# Patient Record
Sex: FEMALE | Race: WHITE | NOT HISPANIC OR LATINO | Employment: OTHER | URBAN - METROPOLITAN AREA
[De-identification: names, ages, dates, MRNs, and addresses within clinical notes are randomized per-mention and may not be internally consistent; named-entity substitution may affect disease eponyms.]

---

## 2023-05-30 ENCOUNTER — HOSPITAL ENCOUNTER (OUTPATIENT)
Dept: RADIOLOGY | Facility: MEDICAL CENTER | Age: 68
End: 2023-05-30
Payer: MEDICARE

## 2023-06-02 ENCOUNTER — HOSPITAL ENCOUNTER (OUTPATIENT)
Dept: RADIOLOGY | Facility: MEDICAL CENTER | Age: 68
End: 2023-06-02
Payer: MEDICARE

## 2023-06-07 ENCOUNTER — APPOINTMENT (OUTPATIENT)
Dept: RADIOLOGY | Facility: MEDICAL CENTER | Age: 68
End: 2023-06-07
Attending: OBSTETRICS & GYNECOLOGY

## 2023-06-26 ENCOUNTER — HOSPITAL ENCOUNTER (OUTPATIENT)
Dept: RADIOLOGY | Facility: MEDICAL CENTER | Age: 68
End: 2023-06-26
Attending: OBSTETRICS & GYNECOLOGY
Payer: MEDICARE

## 2023-06-26 DIAGNOSIS — Z12.31 VISIT FOR SCREENING MAMMOGRAM: ICD-10-CM

## 2023-06-26 PROCEDURE — 77063 BREAST TOMOSYNTHESIS BI: CPT

## 2023-08-04 ENCOUNTER — OFFICE VISIT (OUTPATIENT)
Dept: URGENT CARE | Facility: CLINIC | Age: 68
End: 2023-08-04
Payer: MEDICARE

## 2023-08-04 VITALS
RESPIRATION RATE: 16 BRPM | HEART RATE: 112 BPM | WEIGHT: 160 LBS | SYSTOLIC BLOOD PRESSURE: 136 MMHG | HEIGHT: 70 IN | DIASTOLIC BLOOD PRESSURE: 80 MMHG | BODY MASS INDEX: 22.9 KG/M2 | TEMPERATURE: 99 F | OXYGEN SATURATION: 94 %

## 2023-08-04 DIAGNOSIS — U07.1 COVID-19: ICD-10-CM

## 2023-08-04 PROCEDURE — 99213 OFFICE O/P EST LOW 20 MIN: CPT | Performed by: PHYSICIAN ASSISTANT

## 2023-08-04 PROCEDURE — 3075F SYST BP GE 130 - 139MM HG: CPT | Performed by: PHYSICIAN ASSISTANT

## 2023-08-04 PROCEDURE — 3079F DIAST BP 80-89 MM HG: CPT | Performed by: PHYSICIAN ASSISTANT

## 2023-08-04 ASSESSMENT — ENCOUNTER SYMPTOMS
SHORTNESS OF BREATH: 0
ABDOMINAL PAIN: 0
DIARRHEA: 0
SORE THROAT: 1
COUGH: 1
NAUSEA: 0
MYALGIAS: 0
CHILLS: 0
EYE PAIN: 0
CONSTIPATION: 0
FEVER: 0
VOMITING: 0
HEADACHES: 0

## 2023-08-05 NOTE — PROGRESS NOTES
"Subjective:   Shruthi Thompson is a 67 y.o. female who presents for Cough (X 4 days, cough, tested positive for covid today.)    67-year-old female notes a 4-day history of cough and congestion, runny nose, malaise and fatigue.  Tested positive at home this morning for COVID.  She denies any difficulty breathing, she is vaccinated against COVID-19.  She presents today for antiviral therapy.  She takes no medications daily    Review of Systems   Constitutional:  Positive for malaise/fatigue. Negative for chills and fever.   HENT:  Positive for congestion and sore throat. Negative for ear pain.    Eyes:  Negative for pain.   Respiratory:  Positive for cough. Negative for shortness of breath.    Cardiovascular:  Negative for chest pain.   Gastrointestinal:  Negative for abdominal pain, constipation, diarrhea, nausea and vomiting.   Genitourinary:  Negative for dysuria.   Musculoskeletal:  Negative for myalgias.   Skin:  Negative for rash.   Neurological:  Negative for headaches.       Medications, Allergies, and current problem list reviewed today in Epic.     Objective:     /80   Pulse (!) 112   Temp 37.2 °C (99 °F) (Temporal)   Resp 16   Ht 1.778 m (5' 10\")   Wt 72.6 kg (160 lb)   SpO2 94%     Physical Exam  Vitals reviewed.   Constitutional:       Appearance: Normal appearance.   HENT:      Head: Normocephalic and atraumatic.      Right Ear: External ear normal.      Left Ear: External ear normal.      Nose: Nose normal.      Mouth/Throat:      Mouth: Mucous membranes are moist.   Eyes:      Conjunctiva/sclera: Conjunctivae normal.   Cardiovascular:      Rate and Rhythm: Normal rate and regular rhythm.   Pulmonary:      Effort: Pulmonary effort is normal.      Breath sounds: Normal breath sounds.   Skin:     General: Skin is warm and dry.      Capillary Refill: Capillary refill takes less than 2 seconds.   Neurological:      Mental Status: She is alert and oriented to person, place, and time. "         Assessment/Plan:     Diagnosis and associated orders:     1. COVID-19  Nirmatrelvir&Ritonavir 300/100 20 x 150 MG & 10 x 100MG Tablet Therapy Pack         Comments/MDM:     I have reviewed the patient's chart and discussed current prescription and over-the-counter medication usage.  To the best my knowledge there is no evidence of decreased kidney function nor any medication usage which would adversely interact with Paxlovid antiviral therapy.  I discussed the risk versus benefits with the patient and they have agreed to trial this medication with full knowledge of its emergency use authorization.           Differential diagnosis, natural history, supportive care, and indications for immediate follow-up discussed.    Advised the patient to follow-up with the primary care physician for recheck, reevaluation, and consideration of further management.    Please note that this dictation was created using voice recognition software. I have made a reasonable attempt to correct obvious errors, but I expect that there are errors of grammar and possibly content that I did not discover before finalizing the note.    This note was electronically signed by Sravan Coates PA-C

## 2023-09-05 ENCOUNTER — HOSPITAL ENCOUNTER (EMERGENCY)
Facility: MEDICAL CENTER | Age: 68
End: 2023-09-05
Attending: EMERGENCY MEDICINE
Payer: MEDICARE

## 2023-09-05 VITALS
BODY MASS INDEX: 23.53 KG/M2 | HEART RATE: 105 BPM | TEMPERATURE: 97 F | SYSTOLIC BLOOD PRESSURE: 166 MMHG | DIASTOLIC BLOOD PRESSURE: 95 MMHG | WEIGHT: 164.02 LBS | OXYGEN SATURATION: 97 % | RESPIRATION RATE: 18 BRPM

## 2023-09-05 DIAGNOSIS — H43.812 VITREOUS DETACHMENT OF LEFT EYE: ICD-10-CM

## 2023-09-05 PROCEDURE — 99283 EMERGENCY DEPT VISIT LOW MDM: CPT

## 2023-09-05 PROCEDURE — 700101 HCHG RX REV CODE 250: Performed by: EMERGENCY MEDICINE

## 2023-09-05 RX ORDER — PROPARACAINE HYDROCHLORIDE 5 MG/ML
1 SOLUTION/ DROPS OPHTHALMIC ONCE
Status: COMPLETED | OUTPATIENT
Start: 2023-09-05 | End: 2023-09-05

## 2023-09-05 RX ADMIN — FLUORESCEIN SODIUM 2 MG: 1 STRIP OPHTHALMIC at 11:45

## 2023-09-05 RX ADMIN — PROPARACAINE HYDROCHLORIDE 1 DROP: 5 SOLUTION/ DROPS OPHTHALMIC at 11:45

## 2023-09-05 NOTE — ED PROVIDER NOTES
ED Provider Note    CHIEF COMPLAINT  Chief Complaint   Patient presents with    Blurred Vision     Cloudy spot in L eye for weeks       LIMITATION TO HISTORY   Select: None    HPI  Shruthi Thompson is a 67 y.o. female who presents to the Emergency Department for a floater in her left eye vision has been present stably for the last 2 weeks.  No head trauma.  No flashers.  No current like defect in vision.  No glasses or contact lens wear.  No recent fever rhinorrhea sore throat or cough.  No headache.       OUTSIDE HISTORIAN(S):  None     EXTERNAL RECORDS REVIEWED  Outpatient clinic note reviewed from August 4 for cough and testing positive for COVID    REVIEW OF SYSTEMS  Pertinent positives include: Visual defect in left eye.  Pertinent negatives include: Blurred vision, eye pain.    PAST MEDICAL HISTORY  None    SOCIAL HISTORY  Social History     Tobacco Use    Smoking status: Never     Passive exposure: Never    Smokeless tobacco: Never   Vaping Use    Vaping Use: Never used   Substance Use Topics    Alcohol use: Yes     Comment: occ    Drug use: Never     Social History     Substance and Sexual Activity   Drug Use Never       CURRENT MEDICATIONS    Current Facility-Administered Medications:     proparacaine (Opthaine) 0.5 % ophthalmic solution 1 Drop, 1 Drop, Both Eyes, Once, Evangelist Chacko M.D.    fluorescein ophthalmic strip 2 mg, 2 Strip, Both Eyes, Once, Evangelist Chacko M.D.    Current Outpatient Medications:     Nirmatrelvir&Ritonavir 300/100 20 x 150 MG & 10 x 100MG Tablet Therapy Pack, Take 300 mg nirmatrelvir (two 150 mg tablets) with 100 mg ritonavir (one 100 mg tablet) by mouth, with all three tablets taken together twice daily for 5 days., Disp: 30 Each, Rfl: 0    ALLERGIES  No Known Allergies    PHYSICAL EXAM  VITAL SIGNS: BP (!) 166/95   Pulse (!) 105   Temp 36.1 °C (97 °F) (Temporal)   Resp 18   Wt 74.4 kg (164 lb 0.4 oz)   SpO2 97%   BMI 23.53 kg/m²   Constitutional: Well developed,  Well nourished, tachycardic, elevated blood pressure.  HENT: Normocephalic, Atraumatic, Bilateral external ears normal, Oropharynx moist, No oral exudates, Nose normal.   Eyes: Lids: Normal   Pupils 3 mm and reactive without a Farrand pupillary defect.   Visual Acuity:20/25 os and od.    EOM: Intact without nystagmus.   Visual Fields: Intact by quadrant.   Conjunctiva: No hyperemia or discharge.   Slit Lamp: Anterior chamber normal.   Fluorscein: Not performed.   Tonometry: Not performed.   Fundus: Good red reflex.   Lymphatic: No lymphadenopathy noted.   Respiratory: Rate and excursion normal.  Skin: Warm, Dry, No erythema, No rash.   Neurologic: Cranial nerves II through XII intact        ED COURSE:    INTERVENTIONS BY ME:  Medications   proparacaine (Opthaine) 0.5 % ophthalmic solution 1 Drop (has no administration in time range)   fluorescein ophthalmic strip 2 mg (has no administration in time range)     Management discussed with Dr. Gillespie ophthalmology.  She will see the patient in clinic    MEDICAL DECISION MAKING:  This patient presents with a floater in the vision of her left eye likely has a vitreous detachment.  There is no evidence of clinical retinal detachment.  There is no history of trauma.  There is no evidence of vitreous hemorrhage.    RISK:  Low    MY PLAN:    Vitreous detachment handout given  Referral to ophthalmology completed  Return for flashers or loss of vision    Followup:      Schedule an appointment as soon as possible for a visit in 3 days      Stephanie Gillespie M.D.  [address] [place] [id number]  [phone]      CONDITION: Stable.     FINAL IMPRESSION  1. Vitreous detachment of left eye    2.     Light lamp exam    Electronically signed by: Evangelist Chacko M.D., 9/5/2023 12:23 PM

## 2023-09-05 NOTE — DISCHARGE INSTRUCTIONS
Call Dr. Gillespie for follow-up.  Return to the ER for fashioning lights or vision loss like a curtain crossing your vision.  This is not expected.

## 2023-09-05 NOTE — ED NOTES
Patient discharged per order. Oral and written discharge instructions reviewed. All belongings accounted for and taken with patient. Questions answered, and patient agrees with discharge plan. Encouraged to follow up with opthalmology.

## 2024-01-05 ENCOUNTER — APPOINTMENT (OUTPATIENT)
Dept: RADIOLOGY | Facility: MEDICAL CENTER | Age: 69
End: 2024-01-05
Attending: EMERGENCY MEDICINE
Payer: MEDICARE

## 2024-01-05 ENCOUNTER — HOSPITAL ENCOUNTER (EMERGENCY)
Facility: MEDICAL CENTER | Age: 69
End: 2024-01-05
Attending: EMERGENCY MEDICINE
Payer: MEDICARE

## 2024-01-05 VITALS
OXYGEN SATURATION: 97 % | SYSTOLIC BLOOD PRESSURE: 120 MMHG | HEIGHT: 71 IN | BODY MASS INDEX: 22.38 KG/M2 | TEMPERATURE: 98 F | WEIGHT: 159.83 LBS | HEART RATE: 78 BPM | RESPIRATION RATE: 16 BRPM | DIASTOLIC BLOOD PRESSURE: 81 MMHG

## 2024-01-05 DIAGNOSIS — K57.92 DIVERTICULITIS: ICD-10-CM

## 2024-01-05 LAB
ALBUMIN SERPL BCP-MCNC: 4.4 G/DL (ref 3.2–4.9)
ALBUMIN/GLOB SERPL: 1.5 G/DL
ALP SERPL-CCNC: 54 U/L (ref 30–99)
ALT SERPL-CCNC: 17 U/L (ref 2–50)
ANION GAP SERPL CALC-SCNC: 12 MMOL/L (ref 7–16)
APPEARANCE UR: CLEAR
AST SERPL-CCNC: 18 U/L (ref 12–45)
BACTERIA #/AREA URNS HPF: NEGATIVE /HPF
BASOPHILS # BLD AUTO: 0.6 % (ref 0–1.8)
BASOPHILS # BLD: 0.03 K/UL (ref 0–0.12)
BILIRUB SERPL-MCNC: 0.4 MG/DL (ref 0.1–1.5)
BILIRUB UR QL STRIP.AUTO: NEGATIVE
BUN SERPL-MCNC: 13 MG/DL (ref 8–22)
CALCIUM ALBUM COR SERPL-MCNC: 9.2 MG/DL (ref 8.5–10.5)
CALCIUM SERPL-MCNC: 9.5 MG/DL (ref 8.4–10.2)
CHLORIDE SERPL-SCNC: 102 MMOL/L (ref 96–112)
CO2 SERPL-SCNC: 26 MMOL/L (ref 20–33)
COLOR UR: YELLOW
CREAT SERPL-MCNC: 0.58 MG/DL (ref 0.5–1.4)
EOSINOPHIL # BLD AUTO: 0.06 K/UL (ref 0–0.51)
EOSINOPHIL NFR BLD: 1.3 % (ref 0–6.9)
EPI CELLS #/AREA URNS HPF: NORMAL /HPF
ERYTHROCYTE [DISTWIDTH] IN BLOOD BY AUTOMATED COUNT: 43.8 FL (ref 35.9–50)
GFR SERPLBLD CREATININE-BSD FMLA CKD-EPI: 99 ML/MIN/1.73 M 2
GLOBULIN SER CALC-MCNC: 2.9 G/DL (ref 1.9–3.5)
GLUCOSE SERPL-MCNC: 101 MG/DL (ref 65–99)
GLUCOSE UR STRIP.AUTO-MCNC: NEGATIVE MG/DL
HCT VFR BLD AUTO: 43.6 % (ref 37–47)
HGB BLD-MCNC: 14.8 G/DL (ref 12–16)
IMM GRANULOCYTES # BLD AUTO: 0.02 K/UL (ref 0–0.11)
IMM GRANULOCYTES NFR BLD AUTO: 0.4 % (ref 0–0.9)
KETONES UR STRIP.AUTO-MCNC: ABNORMAL MG/DL
LEUKOCYTE ESTERASE UR QL STRIP.AUTO: NEGATIVE
LIPASE SERPL-CCNC: 23 U/L (ref 11–82)
LYMPHOCYTES # BLD AUTO: 1.02 K/UL (ref 1–4.8)
LYMPHOCYTES NFR BLD: 21.9 % (ref 22–41)
MCH RBC QN AUTO: 34.7 PG (ref 27–33)
MCHC RBC AUTO-ENTMCNC: 33.9 G/DL (ref 32.2–35.5)
MCV RBC AUTO: 102.3 FL (ref 81.4–97.8)
MICRO URNS: ABNORMAL
MONOCYTES # BLD AUTO: 0.5 K/UL (ref 0–0.85)
MONOCYTES NFR BLD AUTO: 10.7 % (ref 0–13.4)
MUCOUS THREADS #/AREA URNS HPF: NORMAL /HPF
NEUTROPHILS # BLD AUTO: 3.03 K/UL (ref 1.82–7.42)
NEUTROPHILS NFR BLD: 65.1 % (ref 44–72)
NITRITE UR QL STRIP.AUTO: NEGATIVE
NRBC # BLD AUTO: 0 K/UL
NRBC BLD-RTO: 0 /100 WBC (ref 0–0.2)
PH UR STRIP.AUTO: 5.5 [PH] (ref 5–8)
PLATELET # BLD AUTO: 170 K/UL (ref 164–446)
PMV BLD AUTO: 9.1 FL (ref 9–12.9)
POTASSIUM SERPL-SCNC: 4.2 MMOL/L (ref 3.6–5.5)
PROT SERPL-MCNC: 7.3 G/DL (ref 6–8.2)
PROT UR QL STRIP: NEGATIVE MG/DL
RBC # BLD AUTO: 4.26 M/UL (ref 4.2–5.4)
RBC # URNS HPF: NORMAL /HPF
RBC UR QL AUTO: ABNORMAL
SODIUM SERPL-SCNC: 140 MMOL/L (ref 135–145)
SP GR UR STRIP.AUTO: 1.02
UNIDENT CRYS URNS QL MICRO: NORMAL /HPF
WBC # BLD AUTO: 4.7 K/UL (ref 4.8–10.8)
WBC #/AREA URNS HPF: NORMAL /HPF

## 2024-01-05 PROCEDURE — 700117 HCHG RX CONTRAST REV CODE 255: Performed by: EMERGENCY MEDICINE

## 2024-01-05 PROCEDURE — 83690 ASSAY OF LIPASE: CPT

## 2024-01-05 PROCEDURE — 36415 COLL VENOUS BLD VENIPUNCTURE: CPT

## 2024-01-05 PROCEDURE — 81001 URINALYSIS AUTO W/SCOPE: CPT

## 2024-01-05 PROCEDURE — 74177 CT ABD & PELVIS W/CONTRAST: CPT

## 2024-01-05 PROCEDURE — 99284 EMERGENCY DEPT VISIT MOD MDM: CPT

## 2024-01-05 PROCEDURE — 80053 COMPREHEN METABOLIC PANEL: CPT

## 2024-01-05 PROCEDURE — 700102 HCHG RX REV CODE 250 W/ 637 OVERRIDE(OP): Performed by: EMERGENCY MEDICINE

## 2024-01-05 PROCEDURE — 85025 COMPLETE CBC W/AUTO DIFF WBC: CPT

## 2024-01-05 PROCEDURE — A9270 NON-COVERED ITEM OR SERVICE: HCPCS | Performed by: EMERGENCY MEDICINE

## 2024-01-05 RX ORDER — AMOXICILLIN AND CLAVULANATE POTASSIUM 875; 125 MG/1; MG/1
1 TABLET, FILM COATED ORAL 2 TIMES DAILY
Qty: 14 TABLET | Refills: 0 | Status: ACTIVE | OUTPATIENT
Start: 2024-01-05 | End: 2024-01-12

## 2024-01-05 RX ORDER — AMOXICILLIN AND CLAVULANATE POTASSIUM 875; 125 MG/1; MG/1
1 TABLET, FILM COATED ORAL ONCE
Status: COMPLETED | OUTPATIENT
Start: 2024-01-05 | End: 2024-01-05

## 2024-01-05 RX ADMIN — AMOXICILLIN AND CLAVULANATE POTASSIUM 1 TABLET: 875; 125 TABLET, FILM COATED ORAL at 11:08

## 2024-01-05 RX ADMIN — IOHEXOL 100 ML: 350 INJECTION, SOLUTION INTRAVENOUS at 10:16

## 2024-01-05 NOTE — ED PROVIDER NOTES
"ER Provider Note    Scribed for Dr. Gaurav Paul M.D. by Sharmaine Jane. 1/5/2024  9:09 AM    Primary Care Provider: Kaitlyn Bateman D.O.    CHIEF COMPLAINT  Abdominal pain    EXTERNAL RECORDS REVIEWED  Outpatient Notes: The patient had a negative mammogram  in June.    HPI/ROS  LIMITATION TO HISTORY   Select: : None    OUTSIDE HISTORIAN(S):      Shruthi Thompson is a 68 y.o. female who presents to the ED for diffuse abdominal pain onset 1 week ago. She reports associated diarrhea and increased gas. She adds that she has had episodes of these symptoms intermittently over the last 6 weeks. She describes her stools as soft, thin, small pieces. She notes her symptoms are improved when she eats a bland diet. She denies any vomiting, chest pain or shortness of breath. She reports a history of tubal ligation 20+ years ago. She denies any dysuria. She notes she has been taking probiotics. The patient states she ate breakfast at 6:30 AM this morning.    PAST MEDICAL HISTORY  None noted    SURGICAL HISTORY  None noted    FAMILY HISTORY  None noted    SOCIAL HISTORY   reports that she has never smoked. She has never been exposed to tobacco smoke. She has never used smokeless tobacco. She reports current alcohol use. She reports that she does not use drugs.    CURRENT MEDICATIONS  Previous Medications    NIRMATRELVIR&RITONAVIR 300/100 20 X 150 MG & 10 X 100MG TABLET THERAPY PACK    Take 300 mg nirmatrelvir (two 150 mg tablets) with 100 mg ritonavir (one 100 mg tablet) by mouth, with all three tablets taken together twice daily for 5 days.       ALLERGIES  Patient has no known allergies.    PHYSICAL EXAM  BP (!) 133/95   Pulse 94   Temp 36.1 °C (97 °F) (Temporal)   Resp 18   Ht 1.803 m (5' 11\")   Wt 72.5 kg (159 lb 13.3 oz)   SpO2 98%   BMI 22.29 kg/m²   Constitutional: Alert in no apparent distress.  HENT: No signs of trauma, Bilateral external ears normal, Nose normal.   Eyes: Pupils are equal and " reactive, Conjunctiva normal, Non-icteric.   Neck: Normal range of motion, No tenderness, Supple,   Cardiovascular: Regular rate and rhythm, no murmurs.   Thorax & Lungs: Normal breath sounds, No respiratory distress, No wheezing, No chest tenderness.   Abdomen: Bowel sounds normal, Soft, left lower and mid abdominal tenderness, mild distension, No masses, No pulsatile masses. No peritoneal signs.  Skin: Warm, Dry, No erythema, No rash.   Back: No bony tenderness, No CVA tenderness.   Extremities: Intact distal pulses, No edema, No tenderness, No cyanosis, no tenderness  Neurologic: Alert , Normal motor function, Normal sensory function, No focal deficits noted.   Psychiatric: Affect normal     DIAGNOSTIC STUDIES & PROCEDURES    Labs:   Labs Reviewed   CBC WITH DIFFERENTIAL - Abnormal; Notable for the following components:       Result Value    WBC 4.7 (*)     .3 (*)     MCH 34.7 (*)     Lymphocytes 21.90 (*)     All other components within normal limits   COMP METABOLIC PANEL - Abnormal; Notable for the following components:    Glucose 101 (*)     All other components within normal limits   URINALYSIS - Abnormal; Notable for the following components:    Ketones Trace (*)     Occult Blood Trace (*)     All other components within normal limits   LIPASE   ESTIMATED GFR   URINE MICROSCOPIC (W/UA)     All labs reviewed by me.    Radiology:   The attending Emergency Physician has independently interpreted the diagnostic imaging associated with this visit and is awaiting the final reading from the radiologist, which will be displayed below.    Preliminary interpretation is a follows: No obvious abscess.  Radiologist interpretation:     CT-ABDOMEN-PELVIS WITH   Final Result      1.  Mild sigmoid diverticulitis.      2.  Fatty liver.      3.  Small enhancing hepatic dome lesion likely representing a hemangioma.           COURSE & MEDICAL DECISION MAKING    ED Observation Status? No; Patient does not meet criteria  for ED Observation.     INITIAL ASSESSMENT AND PLAN  Care Narrative:       9:09 AM - Patient seen and evaluated at bedside for abdominal pain.  at bedside. Discussed plan of care, including labs and imaging. The patient does not want any pain medication at this time. Patient agrees to plan of care. Ordered CBC with diff, CMP, Lipase, UA and CT-Abdomen-Pelvis with to evaluate.    10:40 AM - Imaging is remarkable for diverticulitis. Imaging also shows liver hemangioma which was discussed with the patient. She was informed of the plan for discharge home with Augmentin. She was advised of all return precautions. Patient verbalizes understanding and agreement to this plan of care.     ADDITIONAL PROBLEM LIST AND DISPOSITION   Patient with abdominal pain.  Concerned about intra-abdominal abscess versus diverticulitis.  We will evaluate the patient for anemia as well as leukocytosis as well as infection in the urine.  Will reassess after this.    She has no pancreatitis.  The patient was found have diverticulitis.  There is no abscess.  Will give Augmentin.  There is no anemia.  There is no leukocytosis.  The patient is not septic.  Will discharge home with strict return precautions and follow-up.               DISPOSITION AND DISCUSSIONS  I have discussed management of the patient with the following physicians and YAKOV's: None noted    Discussion of management with other QHP or appropriate source(s): None     Escalation of care considered, and ultimately not performed: acute inpatient care management, however at this time, the patient is most appropriate for outpatient management.    Barriers to care at this time, including but not limited to:  None noted .     Decision tools and prescription drugs considered including, but not limited to: Medication modification: I do not see any indication for medication modification.    The patient will return for new or worsening symptoms and is stable at the time of  discharge.    The patient is referred to a primary physician for blood pressure management, diabetic screening, and for all other preventative health concerns.    DISPOSITION:  Patient will be discharged home in stable condition.    FOLLOW UP:  Kaitlyn Bateman D.O.  [id number] [address] [id number] [phone]    In 3 days        OUTPATIENT MEDICATIONS:  New Prescriptions    AMOXICILLIN-CLAVULANATE (AUGMENTIN) 875-125 MG TAB    Take 1 Tablet by mouth 2 times a day for 7 days.       FINAL IMPRESSION   1. Diverticulitis         Sharmaine REID (Scribe), am scribing for, and in the presence of, Gaurav Paul M.D..    Electronically signed by: Sharmaine Jane (Zev), 1/5/2024    Gaurav REID M.D. personally performed the services described in this documentation, as scribed by Sharmaine Jane in my presence, and it is both accurate and complete.    The note accurately reflects work and decisions made by me.  Gaurav Paul M.D.  1/5/2024  2:15 PM

## 2024-01-05 NOTE — ED NOTES
Patient reports she previously gave urine specimen, however was told that there was not sufficient quantity. Patient ambulatory to restroom to give urine sample.

## 2024-01-05 NOTE — ED NOTES
Patient walks in ambulatory complaining of abdominal pain lower quadrant for the past week. Patient said this has been happening off and on for 6 weeks, but this week it is constant and worst than ever.

## 2024-01-05 NOTE — ED NOTES
Patient verbalized understanding to plan of care and discharge information. Patient in stable condition. Patient ambulated out of ED to person vehicle with stable gait with spouse.

## 2024-02-03 ENCOUNTER — OFFICE VISIT (OUTPATIENT)
Dept: URGENT CARE | Facility: CLINIC | Age: 69
End: 2024-02-03
Payer: MEDICARE

## 2024-02-03 VITALS
HEIGHT: 71 IN | BODY MASS INDEX: 22.12 KG/M2 | HEART RATE: 108 BPM | TEMPERATURE: 97.4 F | SYSTOLIC BLOOD PRESSURE: 102 MMHG | RESPIRATION RATE: 18 BRPM | OXYGEN SATURATION: 98 % | WEIGHT: 158 LBS | DIASTOLIC BLOOD PRESSURE: 66 MMHG

## 2024-02-03 DIAGNOSIS — J32.9 RHINOSINUSITIS: ICD-10-CM

## 2024-02-03 DIAGNOSIS — R05.1 ACUTE COUGH: ICD-10-CM

## 2024-02-03 LAB
FLUAV RNA SPEC QL NAA+PROBE: NEGATIVE
FLUBV RNA SPEC QL NAA+PROBE: NEGATIVE
RSV RNA SPEC QL NAA+PROBE: NEGATIVE
SARS-COV-2 RNA RESP QL NAA+PROBE: NEGATIVE

## 2024-02-03 PROCEDURE — 0241U POCT CEPHEID COV-2, FLU A/B, RSV - PCR: CPT | Performed by: PHYSICIAN ASSISTANT

## 2024-02-03 PROCEDURE — 3078F DIAST BP <80 MM HG: CPT | Performed by: PHYSICIAN ASSISTANT

## 2024-02-03 PROCEDURE — 99213 OFFICE O/P EST LOW 20 MIN: CPT | Performed by: PHYSICIAN ASSISTANT

## 2024-02-03 PROCEDURE — 3074F SYST BP LT 130 MM HG: CPT | Performed by: PHYSICIAN ASSISTANT

## 2024-02-03 RX ORDER — METHYLPREDNISOLONE 4 MG/1
TABLET ORAL
Qty: 21 TABLET | Refills: 0 | Status: SHIPPED | OUTPATIENT
Start: 2024-02-03 | End: 2024-02-14

## 2024-02-03 RX ORDER — AMOXICILLIN AND CLAVULANATE POTASSIUM 875; 125 MG/1; MG/1
1 TABLET, FILM COATED ORAL 2 TIMES DAILY
Qty: 14 TABLET | Refills: 0 | Status: SHIPPED | OUTPATIENT
Start: 2024-02-03 | End: 2024-02-10

## 2024-02-03 ASSESSMENT — FIBROSIS 4 INDEX: FIB4 SCORE: 1.72

## 2024-02-03 NOTE — PROGRESS NOTES
"Subjective:   Shruthi Thompson is a 68 y.o. female who presents for Sinus Problem (X4 days, lost voice today, lost hearing, yellow nasal discharge, sinus headache )     4 day h/o sinus congestion, pressure, progressing with ear ache bilaterally left greater than right.  Yesterday cough and increased left jaw pain preventing sleep.  Lost voice overnight.  No fevers.  Mild myalgias.  No covid concerns.  Traveled last weekend with air travel.          Medications:  Nirmatrelvir&Ritonavir 300/100 Tbpk    Allergies:             Patient has no known allergies.    Surgical History:       No past surgical history on file.    Past Social Hx:  Shruthi Thompson  reports that she has never smoked. She has never been exposed to tobacco smoke. She has never used smokeless tobacco. She reports that she does not currently use alcohol. She reports that she does not use drugs.     Past Family Hx:   Shruthi Thompson family history is not on file.       Problem list, medications, and allergies reviewed by myself today in Epic.     Objective:     /66 (BP Location: Left arm, Patient Position: Sitting, BP Cuff Size: Adult)   Pulse (!) 108   Temp 36.3 °C (97.4 °F) (Temporal)   Resp 18   Ht 1.803 m (5' 11\")   Wt 71.7 kg (158 lb)   SpO2 98%   BMI 22.04 kg/m²     Physical Exam  Vitals and nursing note reviewed.   Constitutional:       General: She is not in acute distress.     Appearance: Normal appearance. She is well-developed. She is not ill-appearing or toxic-appearing.   HENT:      Head: Normocephalic.      Right Ear: External ear normal. No tenderness. A middle ear effusion is present. No mastoid tenderness. Tympanic membrane is injected. Tympanic membrane is not perforated or bulging. Tympanic membrane has decreased mobility.      Left Ear: External ear normal. No tenderness. A middle ear effusion is present. No mastoid tenderness. Tympanic membrane is injected. Tympanic membrane is not " perforated or bulging. Tympanic membrane has decreased mobility.      Nose: Mucosal edema, congestion and rhinorrhea present.      Right Nostril: No foreign body.      Left Nostril: No foreign body.      Right Turbinates: Swollen.      Left Turbinates: Swollen.      Right Sinus: Maxillary sinus tenderness and frontal sinus tenderness present.      Left Sinus: Maxillary sinus tenderness and frontal sinus tenderness present.      Mouth/Throat:      Mouth: Mucous membranes are moist.      Pharynx: Uvula midline. Posterior oropharyngeal erythema present. No pharyngeal swelling, oropharyngeal exudate or uvula swelling.      Tonsils: No tonsillar exudate or tonsillar abscesses.      Comments: Mild pharyngeal edema.  No tonsillar exudate.  Eyes:      Extraocular Movements: Extraocular movements intact.      Pupils: Pupils are equal, round, and reactive to light.   Cardiovascular:      Rate and Rhythm: Normal rate and regular rhythm.      Pulses: Normal pulses.      Heart sounds: Normal heart sounds. No murmur heard.  Pulmonary:      Effort: Pulmonary effort is normal. No tachypnea or respiratory distress.      Breath sounds: Normal breath sounds and air entry. No stridor or decreased air movement. No decreased breath sounds, wheezing, rhonchi or rales.      Comments: Lungs are clear to auscultation bilaterally, no rhonchi rales or wheezes  Chest:      Chest wall: No tenderness.   Musculoskeletal:      Cervical back: Normal range of motion. No rigidity.   Lymphadenopathy:      Cervical: No cervical adenopathy.   Neurological:      Mental Status: She is alert.   Psychiatric:         Behavior: Behavior is cooperative.       Results for orders placed or performed in visit on 02/03/24   POCT CEPHEID COV-2, FLU A/B, RSV - PCR   Result Value Ref Range    SARS-CoV-2 by PCR Negative Negative, Invalid    Influenza virus A RNA Negative Negative, Invalid    Influenza virus B, PCR Negative Negative, Invalid    RSV, PCR Negative  Negative, Invalid      Assessment/Plan:     Diagnosis and Associated Orders:     1. Acute cough  - POCT CEPHEID COV-2, FLU A/B, RSV - PCR    2. Rhinosinusitis  - amoxicillin-clavulanate (AUGMENTIN) 875-125 MG Tab; Take 1 Tablet by mouth 2 times a day for 7 days.  Dispense: 14 Tablet; Refill: 0        Comments/MDM:  Viral PCR negative.  Viral PCR collected.  Suspect viral etiology at this time however did provide contingent antibiotic given fairly significant systemic symptoms.    Medrol Dosepak for severe sinus pressure and ear congestion.  Adverse effects of steroids discussed.    Conservative measures as below:  -Increase water intake  -May use over the counter Ibuprofen/Tylenol as needed for any fever, body aches or throat pain  -May take long acting antihistamine for seasonal allergy symptoms and post-nasal drip as needed  -Over the counter cough suppressant as directed.  -May use over the counter saline nasal spray and/or nasal lavage for nasal congestion as needed  -May use over the counter Nasacort/Flonase for nasal congestion as needed   -May use throat lozenges for throat discomfort as needed   -May gargle with salt water up to 4x/day as needed for throat discomfort (1 tsp salt dissolved in 1 cup warm water)  -Monitor for increased sinus pain/pressure with sinus congestion with thick mucus production, sinus headache, cough, shortness of breath, fever- need re-evaluation   I personally reviewed prior external notes and test results pertinent to today's visit. Supportive care, natural history, differential diagnoses, and indications for immediate follow-up discussed. Return to clinic or go to ED if symptoms worsen or persist.  Red flag symptoms discussed.  Patient/Parent/Guardian voices understanding. Follow-up with your primary care provider in 3-5 days.  All side effects of medication discussed including allergic response, GI upset, tendon injury, rash, sedation etc    Please note that this dictation was  created using voice recognition software. I have made a reasonable attempt to correct obvious errors, but I expect that there are errors of grammar and possibly content that I did not discover before finalizing the note.    This note was electronically signed by Natalie Hinson PA-C

## 2024-02-13 SDOH — ECONOMIC STABILITY: INCOME INSECURITY: IN THE LAST 12 MONTHS, WAS THERE A TIME WHEN YOU WERE NOT ABLE TO PAY THE MORTGAGE OR RENT ON TIME?: NO

## 2024-02-13 SDOH — ECONOMIC STABILITY: HOUSING INSECURITY
IN THE LAST 12 MONTHS, WAS THERE A TIME WHEN YOU DID NOT HAVE A STEADY PLACE TO SLEEP OR SLEPT IN A SHELTER (INCLUDING NOW)?: NO

## 2024-02-13 SDOH — ECONOMIC STABILITY: HOUSING INSECURITY: IN THE LAST 12 MONTHS, HOW MANY PLACES HAVE YOU LIVED?: 1

## 2024-02-13 SDOH — ECONOMIC STABILITY: TRANSPORTATION INSECURITY
IN THE PAST 12 MONTHS, HAS LACK OF TRANSPORTATION KEPT YOU FROM MEETINGS, WORK, OR FROM GETTING THINGS NEEDED FOR DAILY LIVING?: NO

## 2024-02-13 SDOH — ECONOMIC STABILITY: TRANSPORTATION INSECURITY
IN THE PAST 12 MONTHS, HAS THE LACK OF TRANSPORTATION KEPT YOU FROM MEDICAL APPOINTMENTS OR FROM GETTING MEDICATIONS?: NO

## 2024-02-13 SDOH — HEALTH STABILITY: PHYSICAL HEALTH: ON AVERAGE, HOW MANY MINUTES DO YOU ENGAGE IN EXERCISE AT THIS LEVEL?: 30 MIN

## 2024-02-13 SDOH — ECONOMIC STABILITY: FOOD INSECURITY: WITHIN THE PAST 12 MONTHS, YOU WORRIED THAT YOUR FOOD WOULD RUN OUT BEFORE YOU GOT MONEY TO BUY MORE.: NEVER TRUE

## 2024-02-13 SDOH — ECONOMIC STABILITY: FOOD INSECURITY: WITHIN THE PAST 12 MONTHS, THE FOOD YOU BOUGHT JUST DIDN'T LAST AND YOU DIDN'T HAVE MONEY TO GET MORE.: NEVER TRUE

## 2024-02-13 SDOH — ECONOMIC STABILITY: TRANSPORTATION INSECURITY
IN THE PAST 12 MONTHS, HAS LACK OF RELIABLE TRANSPORTATION KEPT YOU FROM MEDICAL APPOINTMENTS, MEETINGS, WORK OR FROM GETTING THINGS NEEDED FOR DAILY LIVING?: NO

## 2024-02-13 SDOH — HEALTH STABILITY: PHYSICAL HEALTH: ON AVERAGE, HOW MANY DAYS PER WEEK DO YOU ENGAGE IN MODERATE TO STRENUOUS EXERCISE (LIKE A BRISK WALK)?: 5 DAYS

## 2024-02-13 SDOH — HEALTH STABILITY: MENTAL HEALTH
STRESS IS WHEN SOMEONE FEELS TENSE, NERVOUS, ANXIOUS, OR CAN'T SLEEP AT NIGHT BECAUSE THEIR MIND IS TROUBLED. HOW STRESSED ARE YOU?: ONLY A LITTLE

## 2024-02-13 ASSESSMENT — LIFESTYLE VARIABLES
HOW MANY STANDARD DRINKS CONTAINING ALCOHOL DO YOU HAVE ON A TYPICAL DAY: 1 OR 2
HOW OFTEN DO YOU HAVE SIX OR MORE DRINKS ON ONE OCCASION: NEVER

## 2024-02-13 ASSESSMENT — SOCIAL DETERMINANTS OF HEALTH (SDOH)
IN A TYPICAL WEEK, HOW MANY TIMES DO YOU TALK ON THE PHONE WITH FAMILY, FRIENDS, OR NEIGHBORS?: MORE THAN THREE TIMES A WEEK
HOW MANY DRINKS CONTAINING ALCOHOL DO YOU HAVE ON A TYPICAL DAY WHEN YOU ARE DRINKING: 1 OR 2
HOW OFTEN DO YOU ATTENT MEETINGS OF THE CLUB OR ORGANIZATION YOU BELONG TO?: NEVER
HOW OFTEN DO YOU GET TOGETHER WITH FRIENDS OR RELATIVES?: TWICE A WEEK
HOW OFTEN DO YOU ATTEND CHURCH OR RELIGIOUS SERVICES?: NEVER
HOW OFTEN DO YOU HAVE SIX OR MORE DRINKS ON ONE OCCASION: NEVER
HOW OFTEN DO YOU ATTEND CHURCH OR RELIGIOUS SERVICES?: NEVER
WITHIN THE PAST 12 MONTHS, YOU WORRIED THAT YOUR FOOD WOULD RUN OUT BEFORE YOU GOT THE MONEY TO BUY MORE: NEVER TRUE
HOW OFTEN DO YOU GET TOGETHER WITH FRIENDS OR RELATIVES?: TWICE A WEEK
DO YOU BELONG TO ANY CLUBS OR ORGANIZATIONS SUCH AS CHURCH GROUPS UNIONS, FRATERNAL OR ATHLETIC GROUPS, OR SCHOOL GROUPS?: NO
HOW OFTEN DO YOU ATTENT MEETINGS OF THE CLUB OR ORGANIZATION YOU BELONG TO?: NEVER
IN A TYPICAL WEEK, HOW MANY TIMES DO YOU TALK ON THE PHONE WITH FAMILY, FRIENDS, OR NEIGHBORS?: MORE THAN THREE TIMES A WEEK
DO YOU BELONG TO ANY CLUBS OR ORGANIZATIONS SUCH AS CHURCH GROUPS UNIONS, FRATERNAL OR ATHLETIC GROUPS, OR SCHOOL GROUPS?: NO

## 2024-02-14 ENCOUNTER — OFFICE VISIT (OUTPATIENT)
Dept: MEDICAL GROUP | Facility: MEDICAL CENTER | Age: 69
End: 2024-02-14
Payer: MEDICARE

## 2024-02-14 VITALS
SYSTOLIC BLOOD PRESSURE: 116 MMHG | OXYGEN SATURATION: 97 % | DIASTOLIC BLOOD PRESSURE: 82 MMHG | BODY MASS INDEX: 22.53 KG/M2 | WEIGHT: 160.94 LBS | HEIGHT: 71 IN | HEART RATE: 99 BPM | TEMPERATURE: 97.2 F

## 2024-02-14 DIAGNOSIS — Z12.31 ENCOUNTER FOR SCREENING MAMMOGRAM FOR BREAST CANCER: ICD-10-CM

## 2024-02-14 DIAGNOSIS — Z23 NEED FOR VACCINATION: ICD-10-CM

## 2024-02-14 DIAGNOSIS — Z12.11 COLON CANCER SCREENING: ICD-10-CM

## 2024-02-14 DIAGNOSIS — Z13.29 THYROID DISORDER SCREEN: ICD-10-CM

## 2024-02-14 DIAGNOSIS — H61.22 IMPACTED CERUMEN OF LEFT EAR: ICD-10-CM

## 2024-02-14 DIAGNOSIS — Z00.00 MEDICARE ANNUAL WELLNESS VISIT, INITIAL: Primary | ICD-10-CM

## 2024-02-14 DIAGNOSIS — Z13.220 LIPID SCREENING: ICD-10-CM

## 2024-02-14 DIAGNOSIS — Z83.3 FAMILY HISTORY OF DIABETES MELLITUS: ICD-10-CM

## 2024-02-14 DIAGNOSIS — E55.9 VITAMIN D DEFICIENCY: ICD-10-CM

## 2024-02-14 DIAGNOSIS — Z00.00 MEDICARE ANNUAL WELLNESS VISIT, SUBSEQUENT: ICD-10-CM

## 2024-02-14 PROCEDURE — 90472 IMMUNIZATION ADMIN EACH ADD: CPT | Performed by: INTERNAL MEDICINE

## 2024-02-14 PROCEDURE — G0438 PPPS, INITIAL VISIT: HCPCS | Mod: 25 | Performed by: INTERNAL MEDICINE

## 2024-02-14 PROCEDURE — 90662 IIV NO PRSV INCREASED AG IM: CPT | Performed by: INTERNAL MEDICINE

## 2024-02-14 PROCEDURE — G0008 ADMIN INFLUENZA VIRUS VAC: HCPCS | Performed by: INTERNAL MEDICINE

## 2024-02-14 PROCEDURE — 3079F DIAST BP 80-89 MM HG: CPT | Performed by: INTERNAL MEDICINE

## 2024-02-14 PROCEDURE — 3074F SYST BP LT 130 MM HG: CPT | Performed by: INTERNAL MEDICINE

## 2024-02-14 PROCEDURE — 90715 TDAP VACCINE 7 YRS/> IM: CPT | Performed by: INTERNAL MEDICINE

## 2024-02-14 RX ORDER — ELECTROLYTES/DEXTROSE
SOLUTION, ORAL ORAL
COMMUNITY

## 2024-02-14 RX ORDER — PYRITHIONE ZINC 1 G/ML
LOTION/SHAMPOO TOPICAL
COMMUNITY

## 2024-02-14 ASSESSMENT — ACTIVITIES OF DAILY LIVING (ADL): BATHING_REQUIRES_ASSISTANCE: 0

## 2024-02-14 ASSESSMENT — PATIENT HEALTH QUESTIONNAIRE - PHQ9: CLINICAL INTERPRETATION OF PHQ2 SCORE: 0

## 2024-02-14 ASSESSMENT — FIBROSIS 4 INDEX: FIB4 SCORE: 1.72

## 2024-02-14 ASSESSMENT — ENCOUNTER SYMPTOMS: GENERAL WELL-BEING: EXCELLENT

## 2024-02-14 NOTE — PROGRESS NOTES
No chief complaint on file.      HPI:  Shruthi Thompson is a 68 y.o. here for Medicare Annual Wellness Visit   Problem   Medicare Annual Wellness Visit, Initial    Patient presents for Medicare wellness visit.  She is feeling well and denies active complaints.    She is due for influenza and Tdap-she would like to receive in office today.    She will be due for mammogram in June 2023.    She has never had a colonoscopy, she did have Cologuard's in the past, which were all normal.    She would like to have a Cologuard since she does not have any family history of colon cancer.    She declines bone density at this time.           Impacted Cerumen of Left Ear       Patient Active Problem List    Diagnosis Date Noted    Medicare annual wellness visit, initial 02/14/2024    Impacted cerumen of left ear 02/14/2024     Current Outpatient Medications   Medication Sig Dispense Refill    Multiple Vitamin (MULTIVITAMIN ADULT) Tab Take  by mouth.      Metamucil Fiber Chew Tab Chew.       No current facility-administered medications for this visit.          Current supplements as per medication list.     Allergies: Patient has no known allergies.    Current social contact/activities: going out, friends     She  reports that she has never smoked. She has never been exposed to tobacco smoke. She has never used smokeless tobacco. She reports current alcohol use of about 3.6 oz of alcohol per week. She reports that she does not use drugs.  Counseling given: Not Answered    ROS:    Gait: Uses no assistive device  Ostomy: No  Other tubes: No  Amputations: No  Chronic oxygen use: No  Last eye exam: Jan 2024  Wears hearing aids: No   : Denies any urinary leakage during the last 6 months    Screening:    Depression Screening  Little interest or pleasure in doing things?  0 - not at all  Feeling down, depressed , or hopeless? 0 - not at all  Patient Health Questionnaire Score: 0     If depressive symptoms identified deferred  to follow up visit unless specifically addressed in assessment and plan.    Interpretation of PHQ-9 Total Score   Score Severity   1-4 No Depression   5-9 Mild Depression   10-14 Moderate Depression   15-19 Moderately Severe Depression   20-27 Severe Depression    Screening for Cognitive Impairment  Do you or any of your friends or family members have any concern about your memory? No  Three Minute Recall (Banana, Sunrise, Chair) 3/3    [place]roy clock face with all 12 numbers and set the hands to show 20 past 8.  Yes    Cognitive concerns identified deferred for follow up unless specifically addressed in assessment and plan.    Fall Risk Assessment  Has the patient had two or more falls in the last year or any fall with injury in the last year?  No    Safety Assessment  Do you always wear your seatbelt?  Yes  Any changes to home needed to function safely? No  Difficulty hearing.  Yes  Patient counseled about all safety risks that were identified.    Functional Assessment ADLs  Are there any barriers preventing you from cooking for yourself or meeting nutritional needs?  No.    Are there any barriers preventing you from driving safely or obtaining transportation?  No.    Are there any barriers preventing you from using a telephone or calling for help?  No    Are there any barriers preventing you from shopping?  No.    Are there any barriers preventing you from taking care of your own finances?  No    Are there any barriers preventing you from managing your medications?  No    Are there any barriers preventing you from showering, bathing or dressing yourself? No    Are there any barriers preventing you from doing housework or laundry? No  Are there any barriers preventing you from using the toilet?No  Are you currently engaging in any exercise or physical activity?  Yes.      Self-Assessment of Health  What is your perception of your health? Excellent  Do you sleep more than six hours a night? Yes  In the past 7 days, how  much did pain keep you from doing your normal work? None  Do you spend quality time with family or friends (virtually or in person)? Yes  Do you usually eat a heart healthy diet that constists of a variety of fruits, vegetables, whole grains and fiber? Yes  Do you eat foods high in fat and/or Fast Food more than three times per week? No    Advance Care Planning  Do you have an Advance Directive, Living Will, Durable Power of , or POLST? No               Health Maintenance Summary            Overdue - Hepatitis C Screening (Once) Never done      No completion history exists for this topic.              Overdue - COVID-19 Vaccine (1) Never done      No completion history exists for this topic.              Ordered - Colorectal Cancer Screening (View Topic Details) Ordered on 2/14/2024      No completion history exists for this topic.              Overdue - Zoster (Shingles) Vaccines (1 of 2) Never done      No completion history exists for this topic.              Postponed - Bone Density Scan (Every 5 Years) Postponed until 2/14/2025      No completion history exists for this topic.              Postponed - Pneumococcal Vaccine: 65+ Years (1 of 1 - PCV) Postponed until 2/14/2025      No completion history exists for this topic.              Annual Wellness Visit (Yearly) Next due on 2/14/2025 02/14/2024  Visit Dx: Medicare annual wellness visit, subsequent    02/14/2024  Prob Dx: Medicare annual wellness visit, initial    02/14/2024  Visit Dx: Medicare annual wellness visit, initial    02/14/2024  Level of Service: INITIAL ANNUAL WELLNESS VISIT-INCLUDES PPPS              Ordered - Mammogram (Every 2 Years) Ordered on 2/14/2024 06/26/2023  MA-SCREENING MAMMO BILAT W/TOMOSYNTHESIS W/CAD    06/06/2022  MA-SCREENING MAMMO BILAT W/TOMOSYNTHESIS W/CAD    05/08/2021  MA-SCREENING MAMMO BILAT W/TOMOSYNTHESIS W/CAD    09/05/2019  MA-SCREENING MAMMO BILAT W/TOMOSYNTHESIS W/CAD              IMM DTaP/Tdap/Td  "Vaccine (2 - Td or Tdap) Next due on 2/14/2034 02/14/2024  Imm Admin: Tdap Vaccine              Influenza Vaccine (Series Information) Completed      02/14/2024  Imm Admin: Influenza Vaccine Adult HD              Hepatitis A Vaccine (Hep A) (Series Information) Aged Out      No completion history exists for this topic.              Hepatitis B Vaccine (Hep B) (Series Information) Aged Out      No completion history exists for this topic.              HPV Vaccines (Series Information) Aged Out      No completion history exists for this topic.              Polio Vaccine (Inactivated Polio) (Series Information) Aged Out      No completion history exists for this topic.              Meningococcal Immunization (Series Information) Aged Out      No completion history exists for this topic.                  Patient Care Team:  Lisa Alva M.D. as PCP - General (Internal Medicine)    Social History     Tobacco Use    Smoking status: Never     Passive exposure: Never    Smokeless tobacco: Never   Vaping Use    Vaping Use: Never used   Substance Use Topics    Alcohol use: Yes     Alcohol/week: 3.6 oz     Types: 6 Glasses of wine per week     Comment: Have a vodka 3 times a week    Drug use: Never     Family History   Problem Relation Age of Onset    Breast Cancer Mother     Diabetes Father     Heart Disease Father     Hypertension Father     Breast Cancer Maternal Aunt      She  has a past medical history of Diverticulitis.   Past Surgical History:   Procedure Laterality Date    OPEN REDUCTION      TUBAL COAGULATION LAPAROSCOPIC BILATERAL         Exam:   /82 (BP Location: Left arm, Patient Position: Sitting, BP Cuff Size: Adult)   Pulse 99   Temp 36.2 °C (97.2 °F) (Temporal)   Ht 1.803 m (5' 11\")   Wt 73 kg (160 lb 15 oz)   SpO2 97%  Body mass index is 22.45 kg/m².    Hearing good.    Dentition good  Alert, oriented in no acute distress.  Eye contact is good, speech goal directed, affect " calm    Assessment and Plan. The following treatment and monitoring plan is recommended:    Problem List Items Addressed This Visit       Impacted cerumen of left ear    Relevant Orders    Ear Irrigation ([place] Only)    Medicare annual wellness visit, initial - Primary     Other Visit Diagnoses       Need for vaccination        Relevant Orders    INFLUENZA VACCINE, HIGH DOSE (65+ ONLY) (Completed)    Tdap =>8yo IM (Completed)    Medicare annual wellness visit, subsequent        Relevant Orders    CBC WITH DIFFERENTIAL    Comp Metabolic Panel    Lipid screening        Relevant Orders    Lipid Profile    Thyroid disorder screen        Relevant Orders    TSH WITH REFLEX TO FT4    Vitamin D deficiency        Relevant Orders    VITAMIN D,25 HYDROXY (DEFICIENCY)    Family history of diabetes mellitus        Relevant Orders    HEMOGLOBIN A1C    Colon cancer screening        Relevant Orders    COLOGUARD (FIT DNA)    Encounter for screening mammogram for breast cancer        Relevant Orders    MA-SCREENING MAMMO BILAT W/TOMOSYNTHESIS W/CAD          Services suggested: No services needed at this time  Health Care Screening: Age-appropriate preventive services recommended by USPTF and ACIP covered by Medicare were discussed today. Services ordered if indicated and agreed upon by the patient.  Referrals offered: Community-based lifestyle interventions to reduce health risks and promote self-management and wellness, fall prevention, nutrition, physical activity, tobacco-use cessation, weight loss, and mental health services as per orders if indicated.    Discussion today about general wellness and lifestyle habits:    Prevent falls and reduce trip hazards; Cautioned about securing or removing rugs.  Have a working fire alarm and carbon monoxide detector;   Engage in regular physical activity and social activities     Follow-up: Return in about 1 year (around 2/14/2025) for Medicare Wellness visit.  Pending labs     Please note that  this dictation was created using voice recognition software. I have made every reasonable attempt to correct obvious errors, but I expect that there are errors of grammar and possibly content that I did not discover before finalizing the note.

## 2024-02-21 ENCOUNTER — TELEMEDICINE (OUTPATIENT)
Dept: MEDICAL GROUP | Facility: MEDICAL CENTER | Age: 69
End: 2024-02-21
Payer: MEDICARE

## 2024-02-21 VITALS — WEIGHT: 160 LBS | HEIGHT: 71 IN | BODY MASS INDEX: 22.4 KG/M2

## 2024-02-21 DIAGNOSIS — J01.81 OTHER ACUTE RECURRENT SINUSITIS: ICD-10-CM

## 2024-02-21 PROBLEM — J01.80 OTHER ACUTE SINUSITIS: Status: ACTIVE | Noted: 2024-02-21

## 2024-02-21 PROCEDURE — 99213 OFFICE O/P EST LOW 20 MIN: CPT | Mod: 95 | Performed by: INTERNAL MEDICINE

## 2024-02-21 RX ORDER — METHYLPREDNISOLONE 4 MG/1
TABLET ORAL
Qty: 21 TABLET | Refills: 0 | Status: SHIPPED | OUTPATIENT
Start: 2024-02-21

## 2024-02-21 ASSESSMENT — ENCOUNTER SYMPTOMS
FEVER: 0
SINUS PAIN: 1

## 2024-02-21 ASSESSMENT — FIBROSIS 4 INDEX: FIB4 SCORE: 1.72

## 2024-02-21 NOTE — ASSESSMENT & PLAN NOTE
This is an ongoing problem, likely secondary to serous sinusitis.   - Medrol Dosepak.   - Antibiotic treatment is not indicated at this time.   - Flonase   - Lora pot (distilled/bottled water only)   - Humidifier   - Oral antihistamines nondrowsy Zyrtec/Allegra/Claritin   - Return to care if symptoms worsen or fail to improve

## 2024-02-21 NOTE — PROGRESS NOTES
"Subjective:     CC:   Chief Complaint   Patient presents with    Follow-Up     Sinus issues- x4 weeks, nasal congestion, colored discharge before now its clear   This visit was conducted via Zoom using secure and encrypted videoconferencing technology.   The [facility]    The patient's identity was confirmed and verbal consent was obtained for this virtual visit.     The encounter diagnosis was Other acute recurrent sinusitis.    HPI: Shruthi is a pleasant 68 y.o. female who presents today for evaluation of the following problem:  Problem   Other Acute Sinusitis    This is an ongoing problem, symptoms started approximately 4 weeks ago.  Patient was seen at urgent care on 2/3/2024 for evaluation of rhinosinusitis (urgent care note reviewed).  She was prescribed a course of Medrol Dosepak and Augmentin, was tested for flu/RSV and COVID.   Was feeling better after she received medications, however symptoms started coming back.  Yesterday she was feeling worse: She took Robitussin syrup, Benadryl and she has been using Flonase spray twice daily.  She has a lot of pressure in her sinuses, as well as postnasal drip.  Mucus is clear.             Past Medical History:   Diagnosis Date    Diverticulitis      Current Outpatient Medications Ordered in Epic   Medication Sig Dispense Refill    methylPREDNISolone (MEDROL DOSEPAK) 4 MG Tablet Therapy Pack As directed on the packaging label. 21 Tablet 0    Multiple Vitamin (MULTIVITAMIN ADULT) Tab Take  by mouth.      Metamucil Fiber Chew Tab Chew.       No current [facility]-ordered facility-administered medications on file.     Review of Systems   Constitutional:  Negative for fever.   HENT:  Positive for congestion and sinus pain.    All other systems reviewed and are negative.    Objective:     Exam:  Ht 1.803 m (5' 11\")   Wt 72.6 kg (160 lb) Comment: pt stated  BMI 22.32 kg/m²  Body mass index is 22.32 kg/m².    Physical Exam  Constitutional: "       General: She is not in acute distress.     Appearance: Normal appearance. She is ill-appearing.      Comments: Limited exam due to virtual appointment   HENT:      Head: Normocephalic and atraumatic.   Pulmonary:      Effort: Pulmonary effort is normal.   Neurological:      Mental Status: She is alert and oriented to person, place, and time.   Psychiatric:         Mood and Affect: Mood normal.       Assessment & Plan:   Shruthi  is a pleasant 68 y.o. female with the following -     Problem List Items Addressed This Visit       Other acute sinusitis     This is an ongoing problem, likely secondary to serous sinusitis.   - Medrol Dosepak.   - Antibiotic treatment is not indicated at this time.   - Flonase   - Lora pot (distilled/bottled water only)   - Humidifier   - Oral antihistamines nondrowsy Zyrtec/Allegra/Claritin   - Return to care if symptoms worsen or fail to improve           Relevant Medications    methylPREDNISolone (MEDROL DOSEPAK) 4 MG Tablet Therapy Pack     Return if symptoms worsen or fail to improve.    Please note that this dictation was created using voice recognition software. I have made every reasonable attempt to correct obvious errors, but I expect that there are errors of grammar and possibly content that I did not discover before finalizing the note.

## 2024-04-08 ENCOUNTER — HOSPITAL ENCOUNTER (OUTPATIENT)
Dept: LAB | Facility: MEDICAL CENTER | Age: 69
End: 2024-04-08
Attending: INTERNAL MEDICINE
Payer: MEDICARE

## 2024-04-08 DIAGNOSIS — Z00.00 MEDICARE ANNUAL WELLNESS VISIT, SUBSEQUENT: ICD-10-CM

## 2024-04-08 DIAGNOSIS — E55.9 VITAMIN D DEFICIENCY: ICD-10-CM

## 2024-04-08 DIAGNOSIS — Z13.220 LIPID SCREENING: ICD-10-CM

## 2024-04-08 DIAGNOSIS — Z83.3 FAMILY HISTORY OF DIABETES MELLITUS: ICD-10-CM

## 2024-04-08 DIAGNOSIS — Z13.29 THYROID DISORDER SCREEN: ICD-10-CM

## 2024-04-08 LAB
25(OH)D3 SERPL-MCNC: 35 NG/ML (ref 30–100)
ALBUMIN SERPL BCP-MCNC: 4.5 G/DL (ref 3.2–4.9)
ALBUMIN/GLOB SERPL: 1.3 G/DL
ALP SERPL-CCNC: 77 U/L (ref 30–99)
ALT SERPL-CCNC: 28 U/L (ref 2–50)
ANION GAP SERPL CALC-SCNC: 12 MMOL/L (ref 7–16)
AST SERPL-CCNC: 36 U/L (ref 12–45)
BASOPHILS # BLD AUTO: 0.5 % (ref 0–1.8)
BASOPHILS # BLD: 0.02 K/UL (ref 0–0.12)
BILIRUB SERPL-MCNC: 1.2 MG/DL (ref 0.1–1.5)
BUN SERPL-MCNC: 13 MG/DL (ref 8–22)
CALCIUM ALBUM COR SERPL-MCNC: 9.6 MG/DL (ref 8.5–10.5)
CALCIUM SERPL-MCNC: 10 MG/DL (ref 8.4–10.2)
CHLORIDE SERPL-SCNC: 99 MMOL/L (ref 96–112)
CHOLEST SERPL-MCNC: 310 MG/DL (ref 100–199)
CO2 SERPL-SCNC: 27 MMOL/L (ref 20–33)
CREAT SERPL-MCNC: 0.58 MG/DL (ref 0.5–1.4)
EOSINOPHIL # BLD AUTO: 0.06 K/UL (ref 0–0.51)
EOSINOPHIL NFR BLD: 1.6 % (ref 0–6.9)
ERYTHROCYTE [DISTWIDTH] IN BLOOD BY AUTOMATED COUNT: 48 FL (ref 35.9–50)
EST. AVERAGE GLUCOSE BLD GHB EST-MCNC: 100 MG/DL
FASTING STATUS PATIENT QL REPORTED: NORMAL
GFR SERPLBLD CREATININE-BSD FMLA CKD-EPI: 99 ML/MIN/1.73 M 2
GLOBULIN SER CALC-MCNC: 3.5 G/DL (ref 1.9–3.5)
GLUCOSE SERPL-MCNC: 103 MG/DL (ref 65–99)
HBA1C MFR BLD: 5.1 % (ref 4–5.6)
HCT VFR BLD AUTO: 47.1 % (ref 37–47)
HDLC SERPL-MCNC: 92 MG/DL
HGB BLD-MCNC: 15.9 G/DL (ref 12–16)
IMM GRANULOCYTES # BLD AUTO: 0 K/UL (ref 0–0.11)
IMM GRANULOCYTES NFR BLD AUTO: 0 % (ref 0–0.9)
LDLC SERPL CALC-MCNC: 194 MG/DL
LYMPHOCYTES # BLD AUTO: 1.18 K/UL (ref 1–4.8)
LYMPHOCYTES NFR BLD: 30.6 % (ref 22–41)
MCH RBC QN AUTO: 33.9 PG (ref 27–33)
MCHC RBC AUTO-ENTMCNC: 33.8 G/DL (ref 32.2–35.5)
MCV RBC AUTO: 100.4 FL (ref 81.4–97.8)
MONOCYTES # BLD AUTO: 0.35 K/UL (ref 0–0.85)
MONOCYTES NFR BLD AUTO: 9.1 % (ref 0–13.4)
NEUTROPHILS # BLD AUTO: 2.24 K/UL (ref 1.82–7.42)
NEUTROPHILS NFR BLD: 58.2 % (ref 44–72)
NRBC # BLD AUTO: 0 K/UL
NRBC BLD-RTO: 0 /100 WBC (ref 0–0.2)
PLATELET # BLD AUTO: 221 K/UL (ref 164–446)
PMV BLD AUTO: 8.8 FL (ref 9–12.9)
POTASSIUM SERPL-SCNC: 3.9 MMOL/L (ref 3.6–5.5)
PROT SERPL-MCNC: 8 G/DL (ref 6–8.2)
RBC # BLD AUTO: 4.69 M/UL (ref 4.2–5.4)
SODIUM SERPL-SCNC: 138 MMOL/L (ref 135–145)
TRIGL SERPL-MCNC: 121 MG/DL (ref 0–149)
TSH SERPL DL<=0.005 MIU/L-ACNC: 1.71 UIU/ML (ref 0.38–5.33)
WBC # BLD AUTO: 3.9 K/UL (ref 4.8–10.8)

## 2024-04-08 PROCEDURE — 84443 ASSAY THYROID STIM HORMONE: CPT | Mod: GA

## 2024-04-08 PROCEDURE — 36415 COLL VENOUS BLD VENIPUNCTURE: CPT

## 2024-04-08 PROCEDURE — 82306 VITAMIN D 25 HYDROXY: CPT

## 2024-04-08 PROCEDURE — 80053 COMPREHEN METABOLIC PANEL: CPT

## 2024-04-08 PROCEDURE — 80061 LIPID PANEL: CPT | Mod: GA

## 2024-04-08 PROCEDURE — 83036 HEMOGLOBIN GLYCOSYLATED A1C: CPT | Mod: GA

## 2024-04-08 PROCEDURE — 85025 COMPLETE CBC W/AUTO DIFF WBC: CPT

## 2024-04-10 PROBLEM — E78.5 HYPERLIPIDEMIA: Status: ACTIVE | Noted: 2024-04-10

## 2024-04-11 ENCOUNTER — OFFICE VISIT (OUTPATIENT)
Dept: MEDICAL GROUP | Facility: MEDICAL CENTER | Age: 69
End: 2024-04-11
Payer: MEDICARE

## 2024-04-11 VITALS
TEMPERATURE: 98.2 F | HEART RATE: 106 BPM | BODY MASS INDEX: 22.44 KG/M2 | SYSTOLIC BLOOD PRESSURE: 112 MMHG | WEIGHT: 160.27 LBS | OXYGEN SATURATION: 98 % | HEIGHT: 71 IN | DIASTOLIC BLOOD PRESSURE: 76 MMHG

## 2024-04-11 DIAGNOSIS — E78.5 HYPERLIPIDEMIA, UNSPECIFIED HYPERLIPIDEMIA TYPE: ICD-10-CM

## 2024-04-11 DIAGNOSIS — R71.8 ELEVATED MCV: ICD-10-CM

## 2024-04-11 DIAGNOSIS — K63.5 POLYP OF COLON, UNSPECIFIED PART OF COLON, UNSPECIFIED TYPE: ICD-10-CM

## 2024-04-11 DIAGNOSIS — M77.12 LEFT LATERAL EPICONDYLITIS: ICD-10-CM

## 2024-04-11 PROCEDURE — 3078F DIAST BP <80 MM HG: CPT | Performed by: INTERNAL MEDICINE

## 2024-04-11 PROCEDURE — 99214 OFFICE O/P EST MOD 30 MIN: CPT | Performed by: INTERNAL MEDICINE

## 2024-04-11 PROCEDURE — 3074F SYST BP LT 130 MM HG: CPT | Performed by: INTERNAL MEDICINE

## 2024-04-11 ASSESSMENT — FIBROSIS 4 INDEX: FIB4 SCORE: 2.06

## 2024-04-11 NOTE — PROGRESS NOTES
"Subjective:     CC:   Diagnoses of Hyperlipidemia, unspecified hyperlipidemia type, Elevated MCV, Left lateral epicondylitis, and Polyp of colon, unspecified part of colon, unspecified type were pertinent to this visit.    HPI: Shruthi is a pleasant 68 y.o. female who presents today to discuss the following problems:    Problem   Colon Polyp    3 polyps were removed during colonoscopy yesterday.  Pathology is pending.       Left Lateral Epicondylitis    Chronic problem, patient was seen at [facility] and she was given steroid injection.  She feels like the effect has been wearing off and she would like to be reevaluated by [facility].     Elevated Mcv    Normal hemoglobin, obtain vitamin B12 and folic acid levels.     Hyperlipidemia    Lab Results   Component Value Date/Time    CHOLSTRLTOT 310 (H) 04/08/2024 07:37 AM     (H) 04/08/2024 07:37 AM    HDL 92 04/08/2024 07:37 AM    TRIGLYCERIDE 121 04/08/2024 07:37 AM      Newly diagnosed, patient has family history of coronary artery disease, however her father living healthy lifestyle and had diabetes.  She walks daily, overall with healthful diet.    She is open to CT cardiac scoring to risk stratify.         Past Medical History:   Diagnosis Date    Diverticulitis      Current Outpatient Medications Ordered in Epic   Medication Sig Dispense Refill    Multiple Vitamin (MULTIVITAMIN ADULT) Tab Take  by mouth.      Metamucil Fiber Chew Tab Chew.       No current [facility]-ordered facility-administered medications on file.     Review of Systems   Musculoskeletal:  Positive for joint pain.   All other systems reviewed and are negative.    Objective:     Exam:  /76 (BP Location: Left arm, Patient Position: Sitting, BP Cuff Size: Adult)   Pulse (!) 106   Temp 36.8 °C (98.2 °F) (Temporal)   Ht 1.803 m (5' 11\")   Wt 72.7 kg (160 lb 4.4 oz)   SpO2 98%   BMI 22.35 kg/m²  Body mass index is 22.35 kg/m².    Physical Exam  Vitals reviewed: By observation.   Constitutional:       " Appearance: Normal appearance.   Pulmonary:      Effort: Pulmonary effort is normal.   Neurological:      Mental Status: She is alert and oriented to person, place, and time.   Psychiatric:         Mood and Affect: Mood normal.       Results: Reviewed and discussed CBC, CMP, TSH, A1c, lipid panel from 4/8/2024.    Assessment & Plan:   Shruthi  is a pleasant 68 y.o. female with the following -     Problem List Items Addressed This Visit       Hyperlipidemia (Chronic)     Continue healthful lifestyle measures, CT cardiac scoring.         Relevant Orders    CT-CARDIAC SCORING    Colon polyp    Elevated MCV     Microcytosis without anemia, obtain vitamin B12 and folic acid levels         Relevant Orders    VITAMIN B12    FOLATE    Left lateral epicondylitis     Refer to [facility].         Relevant Orders    Referral to Orthopedics       No follow-ups on file.  As needed and for Medicare annual on 2/19/2025.  Please note that this dictation was created using voice recognition software. I have made every reasonable attempt to correct obvious errors, but I expect that there are errors of grammar and possibly content that I did not discover before finalizing the note.

## 2024-04-16 ENCOUNTER — HOSPITAL ENCOUNTER (OUTPATIENT)
Dept: RADIOLOGY | Facility: MEDICAL CENTER | Age: 69
End: 2024-04-16
Attending: INTERNAL MEDICINE
Payer: COMMERCIAL

## 2024-04-16 DIAGNOSIS — E78.5 HYPERLIPIDEMIA, UNSPECIFIED HYPERLIPIDEMIA TYPE: ICD-10-CM

## 2024-04-16 PROCEDURE — 4410556 CT-CARDIAC SCORING (SELF PAY ONLY)

## 2024-04-17 DIAGNOSIS — R91.1 LUNG NODULE: ICD-10-CM

## 2024-04-17 NOTE — PROGRESS NOTES
CT scan of the chest without contrast ordered due to incidental finding of 7 mm left lower lobe pulmonary nodule.  Patient was notified on [place].

## 2024-06-27 ENCOUNTER — HOSPITAL ENCOUNTER (OUTPATIENT)
Dept: RADIOLOGY | Facility: MEDICAL CENTER | Age: 69
End: 2024-06-27
Attending: INTERNAL MEDICINE
Payer: MEDICARE

## 2024-06-27 DIAGNOSIS — Z12.31 ENCOUNTER FOR SCREENING MAMMOGRAM FOR BREAST CANCER: ICD-10-CM

## 2024-06-27 PROCEDURE — 77063 BREAST TOMOSYNTHESIS BI: CPT

## 2024-10-22 ENCOUNTER — PATIENT MESSAGE (OUTPATIENT)
Dept: MEDICAL GROUP | Facility: MEDICAL CENTER | Age: 69
End: 2024-10-22
Payer: MEDICARE

## 2025-02-19 ENCOUNTER — OFFICE VISIT (OUTPATIENT)
Dept: MEDICAL GROUP | Age: 70
End: 2025-02-19
Payer: MEDICARE

## 2025-02-19 VITALS
BODY MASS INDEX: 21.76 KG/M2 | HEIGHT: 70 IN | OXYGEN SATURATION: 98 % | DIASTOLIC BLOOD PRESSURE: 80 MMHG | WEIGHT: 152 LBS | RESPIRATION RATE: 16 BRPM | SYSTOLIC BLOOD PRESSURE: 126 MMHG | TEMPERATURE: 98.8 F | HEART RATE: 96 BPM

## 2025-02-19 DIAGNOSIS — H61.22 IMPACTED CERUMEN OF LEFT EAR: ICD-10-CM

## 2025-02-19 DIAGNOSIS — Z83.3 FAMILY HISTORY OF DIABETES MELLITUS: ICD-10-CM

## 2025-02-19 DIAGNOSIS — E55.9 VITAMIN D DEFICIENCY: ICD-10-CM

## 2025-02-19 DIAGNOSIS — J32.1 CHRONIC FRONTAL SINUSITIS: ICD-10-CM

## 2025-02-19 DIAGNOSIS — R71.8 ELEVATED MCV: ICD-10-CM

## 2025-02-19 DIAGNOSIS — Z00.00 MEDICARE ANNUAL WELLNESS VISIT, SUBSEQUENT: ICD-10-CM

## 2025-02-19 DIAGNOSIS — H61.893 EAR CANAL DRYNESS, BILATERAL: ICD-10-CM

## 2025-02-19 DIAGNOSIS — E78.5 HYPERLIPIDEMIA, UNSPECIFIED HYPERLIPIDEMIA TYPE: ICD-10-CM

## 2025-02-19 DIAGNOSIS — A60.00 GENITAL HERPES SIMPLEX, UNSPECIFIED SITE: ICD-10-CM

## 2025-02-19 PROCEDURE — 3074F SYST BP LT 130 MM HG: CPT | Performed by: INTERNAL MEDICINE

## 2025-02-19 PROCEDURE — G0439 PPPS, SUBSEQ VISIT: HCPCS | Performed by: INTERNAL MEDICINE

## 2025-02-19 PROCEDURE — 99214 OFFICE O/P EST MOD 30 MIN: CPT | Mod: 25 | Performed by: INTERNAL MEDICINE

## 2025-02-19 PROCEDURE — 3079F DIAST BP 80-89 MM HG: CPT | Performed by: INTERNAL MEDICINE

## 2025-02-19 RX ORDER — ACYCLOVIR 800 MG/1
800 TABLET ORAL 2 TIMES DAILY
COMMUNITY
End: 2025-02-19 | Stop reason: SDUPTHER

## 2025-02-19 RX ORDER — ACYCLOVIR 50 MG/G
OINTMENT TOPICAL
COMMUNITY
End: 2025-02-19 | Stop reason: SDUPTHER

## 2025-02-19 RX ORDER — ACYCLOVIR 50 MG/G
1 OINTMENT TOPICAL EVERY 4 HOURS
Qty: 30 G | Refills: 5 | Status: SHIPPED | OUTPATIENT
Start: 2025-02-19

## 2025-02-19 RX ORDER — ACYCLOVIR 800 MG/1
800 TABLET ORAL 2 TIMES DAILY
Qty: 20 TABLET | Refills: 5 | Status: SHIPPED | OUTPATIENT
Start: 2025-02-19

## 2025-02-19 RX ORDER — FLUOCINOLONE ACETONIDE 0.11 MG/ML
1 OIL AURICULAR (OTIC)
Qty: 20 ML | Refills: 1 | Status: SHIPPED | OUTPATIENT
Start: 2025-02-19

## 2025-02-19 ASSESSMENT — FIBROSIS 4 INDEX: FIB4 SCORE: 2.09

## 2025-02-19 ASSESSMENT — ACTIVITIES OF DAILY LIVING (ADL): BATHING_REQUIRES_ASSISTANCE: 0

## 2025-02-19 ASSESSMENT — ENCOUNTER SYMPTOMS: GENERAL WELL-BEING: EXCELLENT

## 2025-02-19 ASSESSMENT — PATIENT HEALTH QUESTIONNAIRE - PHQ9: CLINICAL INTERPRETATION OF PHQ2 SCORE: 0

## 2025-02-19 NOTE — PROGRESS NOTES
Chief Complaint   Patient presents with    Annual Wellness Visit    Medication Refill     Acyclovir ointment/ tablets    Referral Needed     ENT     Verbal consent was acquired by the patient to use [employer] ambient listening note generation during this visit Yes   History of Present Illness  Shruthi is a pleasant 69-year-old female presenting for a Medicare annual wellness visit.    She reports no issues with memory or disorientation. She maintains an active lifestyle, including daily walks with her dog, and adheres to a healthy diet. She has undergone a colonoscopy, which revealed 3 benign polyps, and is scheduled for a follow-up in 3 years. She has not had a bone density scan. She feels pretty active and healthy. She supplements her diet with vitamin D and engages in weight-bearing exercises. She reports good hearing and vision, although she has been experiencing itching in her ears since her last earwax removal. She has an upcoming appointment with an ophthalmologist in March, marking over a year since her last visit. She has noticed some changes in her distance vision and has been using reading glasses for some time. She has received the first dose of the shingles vaccine but has not yet completed the series. She declines the pneumonia vaccine and typically does not receive the influenza vaccine. She has a history of herpes virus since the age of 31, with outbreaks occurring once or twice a year, typically at the base of her tailbone. She experienced an outbreak a month ago and is requesting a refill of her acyclovir prescription. She has been experiencing sinus drainage, which is more bothersome at night when she reclines, causing postnasal drip and frequent throat clearing. She uses a humidifier and over-the-counter fluticasone nasal spray once daily before bedtime, which she finds helpful. She does not use a neti pot. She does not swim.    She has been experiencing elbow pain and received a cortisone  injection a few weeks ago, which provided relief for approximately 6 to 8 months.    She does not recall the incidental finding of a lung nodule on her CT scan.    IMMUNIZATIONS  She has received the first dose of the shingles vaccine but has not yet completed the series. She declines the pneumonia vaccine and typically does not receive the influenza vaccine.      Patient Active Problem List    Diagnosis Date Noted    Lung nodule 04/17/2024    Colon polyp 04/11/2024    Left lateral epicondylitis 04/11/2024    Elevated MCV 04/11/2024    Hyperlipidemia 04/10/2024    Other acute sinusitis 02/21/2024    Medicare annual wellness visit, initial 02/14/2024    Impacted cerumen of left ear 02/14/2024     Current Outpatient Medications   Medication Sig Dispense Refill    acyclovir (ZOVIRAX) 800 MG Tab Take 1 Tablet by mouth 2 times a day. 20 Tablet 5    acyclovir (ZOVIRAX) 5 % Ointment Apply 1 Application topically every 4 hours. 30 g 5    Fluocinolone Acetonide 0.01 % Oil Administer 1 Drop into affected ear(s) one time as needed (ear itchiness) for up to 1 dose. 20 mL 1    Multiple Vitamin (MULTIVITAMIN ADULT) Tab Take  by mouth.      Metamucil Fiber Chew Tab Chew.      cyclobenzaprine (FLEXERIL) 10 mg Tab Take 1 Tablet by mouth 1 time a day as needed for Muscle Spasms. (Patient not taking: Reported on 2/19/2025) 30 Tablet 1     No current facility-administered medications for this visit.          Current supplements as per medication list.     Allergies: Patient has no known allergies.    Current social contact/activities: as above      She  reports that she has never smoked. She has never been exposed to tobacco smoke. She has never used smokeless tobacco. She reports current alcohol use of about 3.6 oz of alcohol per week. She reports that she does not use drugs.  Counseling given: Not Answered      ROS:    Gait: Uses no assistive device  Ostomy: No  Other tubes: No  Amputations: No  Chronic oxygen use: No  Last eye exam:  2024  Wears hearing aids: No   : Denies any urinary leakage during the last 6 months    Screening:    Depression Screening  Little interest or pleasure in doing things?  0 - not at all  Feeling down, depressed , or hopeless? 0 - not at all  Patient Health Questionnaire Score: 0     If depressive symptoms identified deferred to follow up visit unless specifically addressed in assessment and plan.    Interpretation of PHQ-9 Total Score   Score Severity   1-4 No Depression   5-9 Mild Depression   10-14 Moderate Depression   15-19 Moderately Severe Depression   20-27 Severe Depression    Screening for Cognitive Impairment  Do you or any of your friends or family members have any concern about your memory? No  Three Minute Recall (Village, Kitchen, Baby) 3/3    [place] clock face with all 12 numbers and set the hands to show 10 minutes past 11.  Yes    Cognitive concerns identified deferred for follow up unless specifically addressed in assessment and plan.    Fall Risk Assessment  Has the patient had two or more falls in the last year or any fall with injury in the last year?  No    Safety Assessment  Do you always wear your seatbelt?  Yes  Any changes to home needed to function safely? No  Difficulty hearing.  No  Patient counseled about all safety risks that were identified.    Functional Assessment ADLs  Are there any barriers preventing you from cooking for yourself or meeting nutritional needs?  No.    Are there any barriers preventing you from driving safely or obtaining transportation?  No.    Are there any barriers preventing you from using a telephone or calling for help?  No    Are there any barriers preventing you from shopping?  No.    Are there any barriers preventing you from taking care of your own finances?  No    Are there any barriers preventing you from managing your medications?  No    Are there any barriers preventing you from showering, bathing or dressing yourself? No    Are there any barriers  preventing you from doing housework or laundry? No  Are there any barriers preventing you from using the toilet?No  Are you currently engaging in any exercise or physical activity?  Yes. Walking everyday     Self-Assessment of Health  What is your perception of your health? Excellent    Do you sleep more than six hours a night? Yes    In the past 7 days, how much did pain keep you from doing your normal work? None    Do you spend quality time with family or friends (virtually or in person)? Yes    Do you usually eat a heart healthy diet that constists of a variety of fruits, vegetables, whole grains and fiber? Yes    Do you eat foods high in fat and/or Fast Food more than three times per week? No    How concerned are you that your medical conditions are not being well managed? Not at all    Are you worried that in the next 2 months, you may not have stable housing that you own, rent, or stay in as part of a household? No      Advance Care Planning  Do you have an Advance Directive, Living Will, Durable Power of , or POLST? Yes          is not on file - instructed patient to bring in a copy to scan into their chart      Health Maintenance Summary            Current Care Gaps       Influenza Vaccine (1) Overdue since 9/1/2024 02/14/2024  Imm Admin: Influenza Vaccine Adult HD              Bone Density Scan (Every 5 Years) Never done     No completion history exists for this topic.              Hepatitis C Screening (Once) Never done     No completion history exists for this topic.              Zoster (Shingles) Vaccines (1 of 2) Never done     No completion history exists for this topic.              Pneumococcal Vaccine: 50+ Years (1 of 1 - PCV) Never done     No completion history exists for this topic.              COVID-19 Vaccine (1 - 2024-25 season) Never done     No completion history exists for this topic.                      Upcoming       Mammogram (Yearly) Next due on 6/27/2025 06/27/2024   MA-SCREENING MAMMO BILAT W/TOMOSYNTHESIS W/CAD    06/26/2023  MA-SCREENING MAMMO BILAT W/TOMOSYNTHESIS W/CAD    06/06/2022  MA-SCREENING MAMMO BILAT W/TOMOSYNTHESIS W/CAD    05/08/2021  MA-SCREENING MAMMO BILAT W/TOMOSYNTHESIS W/CAD    09/05/2019  MA-SCREENING MAMMO BILAT W/TOMOSYNTHESIS W/CAD     Only the first 5 history entries have been loaded, but more history exists.            Annual Wellness Visit (Yearly) Next due on 2/19/2026 02/19/2025  Level of Service: ND ANNUAL WELLNESS VISIT-INCLUDES PPPS SUBSEQUE*    02/19/2025  Visit Dx: Medicare annual wellness visit, subsequent    04/08/2024  Visit Dx: Medicare annual wellness visit, subsequent    02/14/2024  Prob Dx: Medicare annual wellness visit, initial    02/14/2024  Visit Dx: Medicare annual wellness visit, subsequent      Only the first 5 history entries have been loaded, but more history exists.              Colorectal Cancer Screening (Colonoscopy - Every 3 Years) Next due on 4/12/2027 04/12/2024  AMB EXTERNAL COLONOSCOPY RESULTS    04/10/2024  AMB EXTERNAL COLONOSCOPY RESULTS    02/23/2024  COLOGUARD COLON CANCER SCREENING    02/23/2024  COLOGUARD COLON CANCER SCREENING              IMM DTaP/Tdap/Td Vaccine (2 - Td or Tdap) Next due on 2/14/2034 02/14/2024  Imm Admin: Tdap Vaccine                      Completed or No Longer Recommended       Hepatitis A Vaccine (Hep A) (Series Information) Aged Out      No completion history exists for this topic.              Hepatitis B Vaccine (Hep B) (Series Information) Aged Out     No completion history exists for this topic.              HPV Vaccines (Series Information) Aged Out     No completion history exists for this topic.              Polio Vaccine (Inactivated Polio) (Series Information) Aged Out     No completion history exists for this topic.              Meningococcal Immunization (Series Information) Aged Out     No completion history exists for this topic.                       "    Patient Care Team:  Lisa Alva M.D. as PCP - General (Internal Medicine)    Social History     Tobacco Use    Smoking status: Never     Passive exposure: Never    Smokeless tobacco: Never   Vaping Use    Vaping status: Never Used   Substance Use Topics    Alcohol use: Yes     Alcohol/week: 3.6 oz     Types: 6 Glasses of wine per week     Comment: Have a vodka 3 times a week    Drug use: Never     Family History   Problem Relation Age of Onset    Breast Cancer Mother     Diabetes Father     Heart Disease Father     Hypertension Father     Breast Cancer Maternal Aunt      She  has a past medical history of Diverticulitis.   Past Surgical History:   Procedure Laterality Date    OPEN REDUCTION      TUBAL COAGULATION LAPAROSCOPIC BILATERAL         Exam:   /80 (BP Location: Left arm, Patient Position: Sitting, BP Cuff Size: Adult)   Pulse 96   Temp 37.1 °C (98.8 °F) (Temporal)   Resp 16   Ht 1.778 m (5' 10\")   Wt 68.9 kg (152 lb)   SpO2 98%  Body mass index is 21.81 kg/m².    Hearing good.    Dentition good  Alert, oriented in no acute distress.  Eye contact is good, speech goal directed, affect calm    Assessment & Plan  1. Medicare annual wellness visit.  She declines blood work and bone density scan. She is up to date on colorectal cancer screening. Her most recent mammogram was in June 2024. She is not interested in influenza or pneumonia vaccine. She will get her shingles vaccine at the pharmacy.    2. Left-sided cerumen impaction.  Irrigation will be performed. She also reports a lot of ear dryness. Fluocinonide eardrops have been prescribed to be used once a day.    3. Chronic elbow pain  She recently had a steroid injection for her elbow pain, which helps for about 6 to 8 months.    4. Lung nodule.  She was reminded to schedule a CT chest scan to follow up on an incidental finding of a pulmonary nodule from April 2024. Despite lung changes, she has not scheduled the scan.    5. Nasal " congestion.  This is a chronic problem. She has been experiencing a lot of nasal congestion and postnasal drip. She uses Flonase over the counter. She would like a referral to ENT. She was advised to rinse her nose before applying the Flonase to help clear mucus buildup.    6. Herpes simplex virus.  She has had herpes virus since she was 31, with breakouts occurring once or twice a year. Acyclovir cream and tablets has been prescribed and sent to [facility] pharmacy.    Problem List Items Addressed This Visit       Elevated MCV    Hyperlipidemia (Chronic)    Impacted cerumen of left ear    Relevant Orders    Ear Irrigation ([place] Only)     Other Visit Diagnoses         Vitamin D deficiency          Medicare annual wellness visit, subsequent          Family history of diabetes mellitus          Genital herpes simplex, unspecified site        Relevant Medications    acyclovir (ZOVIRAX) 800 MG Tab    acyclovir (ZOVIRAX) 5 % Ointment      Chronic frontal sinusitis        Relevant Orders    Referral to ENT      Ear canal dryness, bilateral        Relevant Medications    Fluocinolone Acetonide 0.01 % Oil    Other Relevant Orders    Ear Irrigation ([place] Only)            Services suggested: No services needed at this time  Health Care Screening: Age-appropriate preventive services recommended by USPTF and ACIP covered by Medicare were discussed today. Services ordered if indicated and agreed upon by the patient.  Referrals offered: Community-based lifestyle interventions to reduce health risks and promote self-management and wellness, fall prevention, nutrition, physical activity, tobacco-use cessation, weight loss, and mental health services as per orders if indicated.    Discussion today about general wellness and lifestyle habits:    Prevent falls and reduce trip hazards; Cautioned about securing or removing rugs.  Have a working fire alarm and carbon monoxide detector;   Engage in regular physical activity and social activities      Follow-up: No follow-ups on file.     Please note that this dictation was created using voice recognition software. I have made every reasonable attempt to correct obvious errors, but I expect that there are errors of grammar and possibly content that I did not discover before finalizing the note.

## 2025-02-20 NOTE — Clinical Note
REFERRAL APPROVAL NOTICE         Sent on February 19, 2025                   Shruthi Thompson  [address]  [place] [address]                   Dear Ms. Dani Thompson,    After a careful review of the medical information and benefit coverage, [facility] has processed your referral. See below for additional details.    If applicable, you must be actively enrolled with your insurance for coverage of the authorized service. If you have any questions regarding your coverage, please contact your insurance directly.    REFERRAL INFORMATION   Referral #:  [id number]  Referred-To Provider    Referred-By Provider:  Otolaryngology    BRIDGET Adhikari STACEY      [address] [place] [id number] [phone] [address] [place] [address]  [phone]    Referral Start Date:  02/19/2025  Referral End Date:   02/19/2026             SCHEDULING  If you do not already have an appointment, please call [phone] to make an appointment.     MORE INFORMATION  If you do not already have a [employer] account, sign up at: [email or web address]  You can access your medical information, make appointments, see lab results, billing information, and more.  If you have questions regarding this referral, please contact  the [facility] Referrals department at:             [phone]. Monday - Friday 8:00AM - 5:00PM.     Sincerely,    [facility]

## 2025-02-23 ENCOUNTER — HOSPITAL ENCOUNTER (OUTPATIENT)
Dept: RADIOLOGY | Facility: MEDICAL CENTER | Age: 70
End: 2025-02-23
Attending: INTERNAL MEDICINE
Payer: MEDICARE

## 2025-02-23 DIAGNOSIS — R91.1 LUNG NODULE: ICD-10-CM

## 2025-02-23 PROCEDURE — 71250 CT THORAX DX C-: CPT

## 2025-02-25 ENCOUNTER — RESULTS FOLLOW-UP (OUTPATIENT)
Dept: MEDICAL GROUP | Age: 70
End: 2025-02-25

## 2025-02-25 DIAGNOSIS — R91.1 LUNG NODULE: ICD-10-CM

## 2025-03-03 NOTE — Clinical Note
REFERRAL APPROVAL NOTICE         Sent on March 3, 2025                   Shruthi Thompson  [address]  [place] [address]                   Dear Ms. Dani Thompson,    After a careful review of the medical information and benefit coverage, [facility] has processed your referral. See below for additional details.    If applicable, you must be actively enrolled with your insurance for coverage of the authorized service. If you have any questions regarding your coverage, please contact your insurance directly.    REFERRAL INFORMATION   Referral #:  [id number]  Referred-To Department    Referred-By Provider:  Pulmonary and Sleep Medicine    Lisa Alva M.D.   Pulmonary/sleep [facility]      [address] [place] [id number] [phone] [address] , [place] [address]  [place] [address] [phone]    Referral Start Date:  02/25/2025  Referral End Date:   02/25/2026           SCHEDULING  If you do not already have an appointment, please call [phone] to make an appointment.   MORE INFORMATION  As a reminder, [facility] ownership has changed, meaning this location is now owned and operated by [facility]. As such, we want to clarify that our patients should expect to receive two separate bills for the services received at [facility] - one representing the [facility] facility fees as the owner of the establishment, and the other to represent the physician's services and subsequent fees. You can speak with your insurance carrier for a pricing estimate by calling the customer service number on the back of your card and ask about charges for a hospital outpatient visit.  If you do not already have a [employer] account, sign up at: [email or web address]  You can access your medical information, make appointments, see lab results,  billing information, and more.  If you have questions regarding this referral, please contact  the [facility] department at:             [phone]. Monday - Friday 7:30AM - 5:00PM.      Sincerely,  [facility]

## 2025-03-14 ENCOUNTER — TELEPHONE (OUTPATIENT)
Dept: HEALTH INFORMATION MANAGEMENT | Facility: OTHER | Age: 70
End: 2025-03-14
Payer: MEDICARE

## 2025-03-27 ENCOUNTER — OFFICE VISIT (OUTPATIENT)
Dept: SLEEP MEDICINE | Facility: MEDICAL CENTER | Age: 70
End: 2025-03-27
Attending: INTERNAL MEDICINE
Payer: MEDICARE

## 2025-03-27 VITALS
HEIGHT: 70 IN | DIASTOLIC BLOOD PRESSURE: 78 MMHG | RESPIRATION RATE: 14 BRPM | WEIGHT: 153 LBS | OXYGEN SATURATION: 96 % | BODY MASS INDEX: 21.9 KG/M2 | HEART RATE: 87 BPM | SYSTOLIC BLOOD PRESSURE: 120 MMHG

## 2025-03-27 DIAGNOSIS — R91.8 LUNG NODULES: ICD-10-CM

## 2025-03-27 PROCEDURE — 99212 OFFICE O/P EST SF 10 MIN: CPT | Performed by: FAMILY MEDICINE

## 2025-03-27 PROCEDURE — 99203 OFFICE O/P NEW LOW 30 MIN: CPT | Performed by: FAMILY MEDICINE

## 2025-03-27 ASSESSMENT — FIBROSIS 4 INDEX: FIB4 SCORE: 2.09

## 2025-03-27 NOTE — PROGRESS NOTES
Subjective:     CC:  The encounter diagnosis was Lung nodules.    HISTORY OF THE PRESENT ILLNESS: Patient is a 69 y.o. female. This pleasant patient is here today to establish care in the [facility] and discuss lung nodules. Her referring provider is Dr. Lisa Alva.    Lung nodules- Shruthi is a 69F former smoker (5 pack year history, quit smoking 38 years ago) with HLD presenting for evaluation of lung nodules.  Shruthi was first noted to have a 7mm ill-defined mixed groundglass and solid nodule of the left lower lobe on CT cardiac score 4/17/24.  She had a CT chest on 2/23/25 which showed resolution of the left lower lobe nodule noted on CT chest 4/16/24.  On her 4/17/24 and 2/23/25 images, she had a 7mm left lower lobe subpleural nodule and a 4mm right lower lobe subpleural nodule.    She has no family history of lung disease.  Her mother and maternal aunt had breast cancer.  Her cousin had colon cancer in her late 50s.  She had significant second hand tobacco smoke exposure as a child.  She smoked very minimal THC in her early 20s.  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    She enjoys sports and swimming, biking, tennis, skiing.  She has lived in [place], [place], [place], [place] and [place].  No significant exposure to dust, fumes, solvents, gases, tuberculosis, radon, chemicals, radiation, or asbestos.  She has no history of hospitalization for respiratory problems or been on a ventilator.  She has no personal history of asthma, pneumonia, emphysema, or COPD.    No recent fevers, chills, weight loss, cough, sputum, hemoptysis, SOB, wheezing, chest pain, palpitations, nausea, vomiting, GERD symptoms, abnormal bleeding.  Her energy level is normal overall, but has been awakened a lot recently due to having a new puppy who is awakening regularly.        Allergies: Patient has no known allergies.    Current Outpatient Medications Ordered in Epic    Medication Sig Dispense Refill    acyclovir (ZOVIRAX) 800 MG Tab Take 1 Tablet by mouth 2 times a day. 20 Tablet 5    acyclovir (ZOVIRAX) 5 % Ointment Apply 1 Application topically every 4 hours. 30 g 5    Fluocinolone Acetonide 0.01 % Oil Administer 1 Drop into affected ear(s) one time as needed (ear itchiness) for up to 1 dose. 20 mL 1    Multiple Vitamin (MULTIVITAMIN ADULT) Tab Take  by mouth.      Metamucil Fiber Chew Tab Chew.       No current [facility]-ordered facility-administered medications on file.       Past Medical History:   Diagnosis Date    Diverticulitis        Past Surgical History:   Procedure Laterality Date    NASAL RECONSTRUCTION      OPEN REDUCTION      7 years old- arm    TUBAL COAGULATION LAPAROSCOPIC BILATERAL         Social History     Tobacco Use    Smoking status: Former     Current packs/day: 0.00     Average packs/day: 1 pack/day for 5.0 years (5.0 ttl pk-yrs)     Types: Cigarettes     Quit date:      Years since quittin.2     Passive exposure: Never    Smokeless tobacco: Never   Vaping Use    Vaping status: Never Used   Substance Use Topics    Alcohol use: Yes     Alcohol/week: 1.8 oz     Types: 3 Glasses of wine per week     Comment: Have a vodka 3 times a week    Drug use: Not Currently       Social History     Social History Narrative    Not on file       Family History   Problem Relation Age of Onset    Breast Cancer Mother 80 - 89    Diabetes Father     Heart Disease Father     Hypertension Father     Breast Cancer Maternal Aunt 72 - 73    Breast Cancer Maternal Grandfather         stomach    Breast Cancer Cousin 50 - 59        colon    Lung Disease Neg Hx        Health Maintenance: recommended continue breast cancer screening and colon cancer screening.     ROS:   Gen: no fevers/chills, no changes in weight  ENT: no bloody nose  Pulm: no sob, no cough  CV: no chest pain, no palpitations  GI: no nausea/vomiting, no diarrhea  : no dysuria  MSk: chronic left elbow  "discomfort for which she is getting steroid injections  Skin: no rash  Neuro: no headaches, no numbness/tingling  Heme/Lymph: no  abnormal bleeding      Objective:     Exam: /78 (BP Location: Right arm, Patient Position: Sitting, BP Cuff Size: Adult)   Pulse 87   Resp 14   Ht 1.778 m (5' 10\")   Wt 69.4 kg (153 lb)   SpO2 96%  Body mass index is 21.95 kg/m².    General: Normal appearing. No distress.  HEENT: Normocephalic.    Eyes: Eyes conjunctiva clear lids without ptosis  Neck: Supple. Thyroid is not enlarged.  Pulmonary: Clear to ausculation.  Normal effort. No rales, ronchi, or wheezing.  Cardiovascular: Regular rate and rhythm without murmur.  Abdomen: Soft, nontender, nondistended. Normal bowel sounds.   Neurologic: No gross motor deficits  Lymph: No cervical or supraclavicular lymph nodes are palpable  Skin: Warm and dry.  No obvious lesions.  Musculoskeletal: No extremity cyanosis, clubbing, or edema.  Psych: Normal mood and affect. Alert and oriented. Judgment and insight is normal.      Assessment & Plan:   69 y.o. female with the following -  1. Lung nodules  Newly presenting, asymptomatic.  Shruthi is a moderate risk patient given her remote smoking history.  We reviewed her CT imaging from 4/16/24-2/23/25 together today.  We discussed that the 7mm nodule noted on 4/16/24 resolved.  She has a 4mm right lower lobe nodule that has remained stable 4/16/24-2/23/25.  She has a 7mm left lower lobe nodule that is slightly more prominent on her CT from 2/23/25 compared to her imaging from 4/16/24.  We discussed that lung nodules can be due to inflammation, scarring, infection, tumor/malignancy. Given that the 7mm nodule is below the threshold for biopsy, minimal exposure risks, and hx of a nodule that resolved, recommended serial imaging with a follow-up CT in 3-6 months (due 5-11/25) based on the Fleischner Society 2017 recommendations.  Encouraged avoidance of tobacco products and lung irritants.  " Follow-up and red flag precautions given. Patient expressed understanding and agreement with plan.  - CT-CHEST (THORAX) W/O; Future      Return in about 3 months (around 6/27/2025) for Follow-up lung nodules after CT obtained.    Please note that this dictation was created using voice recognition software. I have made every reasonable attempt to correct obvious errors, but I expect that there are errors of grammar and possibly content that I did not discover before finalizing the note.

## 2025-05-23 ENCOUNTER — HOSPITAL ENCOUNTER (OUTPATIENT)
Dept: RADIOLOGY | Facility: MEDICAL CENTER | Age: 70
End: 2025-05-23
Attending: FAMILY MEDICINE
Payer: MEDICARE

## 2025-05-23 DIAGNOSIS — R91.8 LUNG NODULES: ICD-10-CM

## 2025-05-23 PROCEDURE — 71250 CT THORAX DX C-: CPT

## 2025-05-27 ENCOUNTER — RESULTS FOLLOW-UP (OUTPATIENT)
Dept: SLEEP MEDICINE | Facility: MEDICAL CENTER | Age: 70
End: 2025-05-27

## 2025-06-19 ENCOUNTER — OFFICE VISIT (OUTPATIENT)
Dept: MEDICAL GROUP | Age: 70
End: 2025-06-19
Payer: MEDICARE

## 2025-06-19 VITALS
HEIGHT: 70 IN | OXYGEN SATURATION: 99 % | BODY MASS INDEX: 22.48 KG/M2 | TEMPERATURE: 98.5 F | HEART RATE: 116 BPM | WEIGHT: 157 LBS | DIASTOLIC BLOOD PRESSURE: 86 MMHG | SYSTOLIC BLOOD PRESSURE: 136 MMHG

## 2025-06-19 DIAGNOSIS — E55.9 VITAMIN D DEFICIENCY: ICD-10-CM

## 2025-06-19 DIAGNOSIS — R73.09 ELEVATED GLUCOSE: ICD-10-CM

## 2025-06-19 DIAGNOSIS — E78.5 HYPERLIPIDEMIA, UNSPECIFIED HYPERLIPIDEMIA TYPE: ICD-10-CM

## 2025-06-19 DIAGNOSIS — R71.8 ELEVATED MCV: ICD-10-CM

## 2025-06-19 DIAGNOSIS — R91.1 LUNG NODULE: Primary | ICD-10-CM

## 2025-06-19 PROCEDURE — 99214 OFFICE O/P EST MOD 30 MIN: CPT | Performed by: PHYSICIAN ASSISTANT

## 2025-06-19 PROCEDURE — 3075F SYST BP GE 130 - 139MM HG: CPT | Performed by: PHYSICIAN ASSISTANT

## 2025-06-19 PROCEDURE — 3079F DIAST BP 80-89 MM HG: CPT | Performed by: PHYSICIAN ASSISTANT

## 2025-06-19 ASSESSMENT — FIBROSIS 4 INDEX: FIB4 SCORE: 2.09

## 2025-06-19 NOTE — PROGRESS NOTES
"Subjective:     History of Present Illness  The patient presents for evaluation of mixed hyperlipidemia.    She underwent a follow-up CT scan in 05/2025, which revealed the presence of several spots. Subsequent blood tests indicated elevated cholesterol levels. Her , a physician, recommended further evaluation. She did not have a blood test during her annual visit with Dr. IYER in 02/2025 as she was feeling well at that time. She has an upcoming appointment with Dr. Genao next week for a follow-up on the CT scan results. She is not currently on any cholesterol-lowering medication and does not wish to start any. She began taking a teaspoon of organic cold-pressed grapeseed oil three days ago, as per her 's suggestion, due to its potential benefits for Alzheimer's disease and cholesterol management.    She reports no known thyroid issues.    She has a history of low vitamin D levels but does not spend much time in the sun. She has been spending more time in the sun since moving from [place].    She maintains an active lifestyle, frequently walking her dogs. She has a [employer] bike at home but has not yet started using it due to recent flare-ups in her left elbow. She plans to seek medical attention for this issue and may receive a cortisone injection. She has braces available for use as needed.    She has a mammogram scheduled for 07/2025.    FAMILY HISTORY  Her father was diabetic, and her youngest sister is diabetic. Her mother is very healthy.      Current medicines (including changes today)  Current Medications[1]  She  has a past medical history of Diverticulitis.    ROS   No chest pain, no shortness of breath, no abdominal pain  Positive ROS as per HPI.  All other systems reviewed and are negative.     Objective:     /86 (BP Location: Right arm, Patient Position: Sitting, BP Cuff Size: Small adult)   Pulse (!) 116   Temp 36.9 °C (98.5 °F)   Ht 1.778 m (5' 10\")   Wt 71.2 kg (157 lb)   SpO2 " 99%  Body mass index is 22.53 kg/m².   Physical Exam    Constitutional: Alert, no distress.  Skin: Warm, dry, good turgor, no rashes in visible areas.  Eye: Equal, round and reactive, conjunctiva clear, lids normal.  ENMT: Lips without lesions, good dentition, oropharynx clear.  Neck: Trachea midline, no masses, no thyromegaly. No cervical or supraclavicular lymphadenopathy  Respiratory: Unlabored respiratory effort, lungs clear to auscultation, no wheezes, no ronchi.  Cardiovascular: Normal S1, S2, no murmur, no edema.  Abdomen: Soft, non-tender, no masses, no hepatosplenomegaly.  Psych: Alert and oriented x3, normal affect and mood.      Results  Labs   - Cholesterol: Elevated   - Vitamin D: Normal at 35            Assessment and Plan:   The following treatment plan was discussed    Assessment & Plan  1. Mixed hyperlipidemia.  - History of elevated cholesterol levels.  - Lipid panel ordered to monitor cholesterol levels.  - Prefers dietary management with organic cold-pressed grape seed oil.    2. Health maintenance.  - Mammogram scheduled for 07/2025.  - Annual labs ordered, including CBC, liver function tests, kidney function tests, electrolyte panel, and hemoglobin A1c.  - Monitoring overall health and checking for abnormalities such as anemia, bone marrow issues, or diabetes.  - Labs can be done today or tomorrow; fasting is better.    3. Left elbow pain.  - Reports left elbow flaring up.  - Plans to see ortho 07/2025 for a possible cortisone shot.  - Has braces to wear as needed.  - Monitoring pain and mobility.    4. Vitamin D deficiency.  - History of low vitamin D levels.  - Last vitamin D level was normal at 35.  - Repeat vitamin D level will be checked as part of annual labs.  - Monitoring sun exposure and vitamin D levels.    5. Thyroid disorder.  - No known thyroid issues.  - Thyroid function tests not ordered.  - Monitoring for any potential thyroid abnormalities.  - Ensuring comprehensive health  assessment.    6. Lung nodule: diagnosed in 2024. Is being followed by lung nodule clinic Dr JAYLON Shin, most recent CT chest 5/23/25.    ORDERS:  1. Lung nodule (Primary)    - Comp Metabolic Panel; Future    2. Hyperlipidemia, unspecified hyperlipidemia type    - LIPID PANEL    3. Elevated MCV    - CBC WITH DIFFERENTIAL; Future    4. Vitamin D deficiency    - VITAMIN D 25-HYDROXY    5. Elevated glucose    - Comp Metabolic Panel; Future  - HEMOGLOBIN A1C; Future          Follow Up: as scheduled with pcp    Please note that this dictation was created using voice recognition software. I have made every reasonable attempt to correct obvious errors, but I expect that there are errors of grammar and possibly content that I did not discover before finalizing the note.      Attestation      Verbal consent was acquired by the patient to use [employer] ambient listening note generation during this visit Yes                  [1]   Current Outpatient Medications   Medication Sig Dispense Refill    acyclovir (ZOVIRAX) 800 MG Tab Take 1 Tablet by mouth 2 times a day. 20 Tablet 5    acyclovir (ZOVIRAX) 5 % Ointment Apply 1 Application topically every 4 hours. 30 g 5    Fluocinolone Acetonide 0.01 % Oil Administer 1 Drop into affected ear(s) one time as needed (ear itchiness) for up to 1 dose. 20 mL 1    Multiple Vitamin (MULTIVITAMIN ADULT) Tab Take  by mouth.      Metamucil Fiber Chew Tab Chew.       No current facility-administered medications for this visit.

## 2025-06-28 ENCOUNTER — OFFICE VISIT (OUTPATIENT)
Dept: URGENT CARE | Facility: CLINIC | Age: 70
End: 2025-06-28
Payer: MEDICARE

## 2025-06-28 VITALS
BODY MASS INDEX: 22.45 KG/M2 | HEIGHT: 70 IN | WEIGHT: 156.8 LBS | SYSTOLIC BLOOD PRESSURE: 112 MMHG | HEART RATE: 86 BPM | RESPIRATION RATE: 14 BRPM | OXYGEN SATURATION: 94 % | TEMPERATURE: 98.1 F | DIASTOLIC BLOOD PRESSURE: 64 MMHG

## 2025-06-28 DIAGNOSIS — S91.312A LACERATION OF LEFT HEEL, INITIAL ENCOUNTER: Primary | ICD-10-CM

## 2025-06-28 ASSESSMENT — FIBROSIS 4 INDEX: FIB4 SCORE: 2.09

## 2025-06-28 NOTE — PROCEDURES
Left heal laceration, thicken skin, unable to keep it closed  Laceration Repair    Date/Time: 6/28/2025 4:53 PM    Performed by: Vidal Tovar M.D.  Authorized by: Vidal Tovar M.D.  Laceration length: 1 cm  Foreign bodies: no foreign bodies  Tendon involvement: none  Nerve involvement: none  Vascular damage: no    Sedation:  Patient sedated: no    Preparation: Patient was prepped and draped in the usual sterile fashion.  Skin closure: glue  Approximation: close  Dressing: 4x4 sterile gauze  Comments: Surgical glue used to close wound and bandage placed over top

## 2025-07-07 ENCOUNTER — HOSPITAL ENCOUNTER (OUTPATIENT)
Facility: MEDICAL CENTER | Age: 70
End: 2025-07-07
Attending: PHYSICIAN ASSISTANT
Payer: MEDICARE

## 2025-07-07 DIAGNOSIS — R71.8 ELEVATED MCV: ICD-10-CM

## 2025-07-07 DIAGNOSIS — R91.1 LUNG NODULE: ICD-10-CM

## 2025-07-07 DIAGNOSIS — R73.09 ELEVATED GLUCOSE: ICD-10-CM

## 2025-07-07 LAB
ALBUMIN SERPL BCP-MCNC: 4.7 G/DL (ref 3.2–4.9)
ALBUMIN/GLOB SERPL: 1.7 G/DL
ALP SERPL-CCNC: 73 U/L (ref 30–99)
ALT SERPL-CCNC: 54 U/L (ref 2–50)
ANION GAP SERPL CALC-SCNC: 14 MMOL/L (ref 7–16)
AST SERPL-CCNC: 51 U/L (ref 12–45)
BASOPHILS # BLD AUTO: 0.5 % (ref 0–1.8)
BASOPHILS # BLD: 0.02 K/UL (ref 0–0.12)
BILIRUB SERPL-MCNC: 1 MG/DL (ref 0.1–1.5)
BUN SERPL-MCNC: 13 MG/DL (ref 8–22)
CALCIUM ALBUM COR SERPL-MCNC: 9.7 MG/DL (ref 8.5–10.5)
CALCIUM SERPL-MCNC: 10.3 MG/DL (ref 8.5–10.5)
CHLORIDE SERPL-SCNC: 100 MMOL/L (ref 96–112)
CHOLEST SERPL-MCNC: 291 MG/DL (ref 100–199)
CO2 SERPL-SCNC: 24 MMOL/L (ref 20–33)
CREAT SERPL-MCNC: 0.7 MG/DL (ref 0.5–1.4)
EOSINOPHIL # BLD AUTO: 0.04 K/UL (ref 0–0.51)
EOSINOPHIL NFR BLD: 1 % (ref 0–6.9)
ERYTHROCYTE [DISTWIDTH] IN BLOOD BY AUTOMATED COUNT: 45.1 FL (ref 35.9–50)
EST. AVERAGE GLUCOSE BLD GHB EST-MCNC: 100 MG/DL
FASTING STATUS PATIENT QL REPORTED: NORMAL
GFR SERPLBLD CREATININE-BSD FMLA CKD-EPI: 94 ML/MIN/1.73 M 2
GLOBULIN SER CALC-MCNC: 2.7 G/DL (ref 1.9–3.5)
GLUCOSE SERPL-MCNC: 116 MG/DL (ref 65–99)
HBA1C MFR BLD: 5.1 % (ref 4–5.6)
HCT VFR BLD AUTO: 48 % (ref 37–47)
HDLC SERPL-MCNC: 115 MG/DL
HGB BLD-MCNC: 16.2 G/DL (ref 12–16)
IMM GRANULOCYTES # BLD AUTO: 0.01 K/UL (ref 0–0.11)
IMM GRANULOCYTES NFR BLD AUTO: 0.3 % (ref 0–0.9)
LDLC SERPL CALC-MCNC: 163 MG/DL
LYMPHOCYTES # BLD AUTO: 0.81 K/UL (ref 1–4.8)
LYMPHOCYTES NFR BLD: 20.7 % (ref 22–41)
MCH RBC QN AUTO: 35.4 PG (ref 27–33)
MCHC RBC AUTO-ENTMCNC: 33.8 G/DL (ref 32.2–35.5)
MCV RBC AUTO: 105 FL (ref 81.4–97.8)
MONOCYTES # BLD AUTO: 0.35 K/UL (ref 0–0.85)
MONOCYTES NFR BLD AUTO: 8.9 % (ref 0–13.4)
NEUTROPHILS # BLD AUTO: 2.69 K/UL (ref 1.82–7.42)
NEUTROPHILS NFR BLD: 68.6 % (ref 44–72)
NRBC # BLD AUTO: 0 K/UL
NRBC BLD-RTO: 0 /100 WBC (ref 0–0.2)
PLATELET # BLD AUTO: 183 K/UL (ref 164–446)
PMV BLD AUTO: 9.2 FL (ref 9–12.9)
POTASSIUM SERPL-SCNC: 4.8 MMOL/L (ref 3.6–5.5)
PROT SERPL-MCNC: 7.4 G/DL (ref 6–8.2)
RBC # BLD AUTO: 4.57 M/UL (ref 4.2–5.4)
SODIUM SERPL-SCNC: 138 MMOL/L (ref 135–145)
TRIGL SERPL-MCNC: 66 MG/DL (ref 0–149)
WBC # BLD AUTO: 3.9 K/UL (ref 4.8–10.8)

## 2025-07-07 PROCEDURE — 36415 COLL VENOUS BLD VENIPUNCTURE: CPT

## 2025-07-07 PROCEDURE — 83036 HEMOGLOBIN GLYCOSYLATED A1C: CPT | Mod: GA

## 2025-07-07 PROCEDURE — 80053 COMPREHEN METABOLIC PANEL: CPT

## 2025-07-07 PROCEDURE — 80061 LIPID PANEL: CPT

## 2025-07-07 PROCEDURE — 85025 COMPLETE CBC W/AUTO DIFF WBC: CPT

## 2025-07-11 ENCOUNTER — HOSPITAL ENCOUNTER (OUTPATIENT)
Facility: MEDICAL CENTER | Age: 70
End: 2025-07-11
Attending: INTERNAL MEDICINE
Payer: MEDICARE

## 2025-07-11 ENCOUNTER — APPOINTMENT (OUTPATIENT)
Dept: RADIOLOGY | Facility: MEDICAL CENTER | Age: 70
End: 2025-07-11
Attending: INTERNAL MEDICINE
Payer: MEDICARE

## 2025-07-11 DIAGNOSIS — Z12.31 VISIT FOR SCREENING MAMMOGRAM: ICD-10-CM

## 2025-07-11 PROCEDURE — 77063 BREAST TOMOSYNTHESIS BI: CPT

## 2025-07-14 ENCOUNTER — RESULTS FOLLOW-UP (OUTPATIENT)
Dept: MEDICAL GROUP | Age: 70
End: 2025-07-14
Payer: MEDICARE